# Patient Record
Sex: FEMALE | Race: WHITE | Employment: OTHER | URBAN - NONMETROPOLITAN AREA
[De-identification: names, ages, dates, MRNs, and addresses within clinical notes are randomized per-mention and may not be internally consistent; named-entity substitution may affect disease eponyms.]

---

## 2017-02-07 ENCOUNTER — OFFICE VISIT (OUTPATIENT)
Dept: FAMILY MEDICINE CLINIC | Age: 57
End: 2017-02-07

## 2017-02-07 VITALS
WEIGHT: 190.1 LBS | HEIGHT: 63 IN | DIASTOLIC BLOOD PRESSURE: 78 MMHG | SYSTOLIC BLOOD PRESSURE: 120 MMHG | OXYGEN SATURATION: 96 % | HEART RATE: 69 BPM | TEMPERATURE: 98 F | BODY MASS INDEX: 33.68 KG/M2

## 2017-02-07 DIAGNOSIS — Z13.1 SCREENING FOR DIABETES MELLITUS (DM): ICD-10-CM

## 2017-02-07 DIAGNOSIS — M54.50 CHRONIC BILATERAL LOW BACK PAIN WITHOUT SCIATICA: ICD-10-CM

## 2017-02-07 DIAGNOSIS — Z13.220 SCREENING CHOLESTEROL LEVEL: ICD-10-CM

## 2017-02-07 DIAGNOSIS — G89.29 CHRONIC BILATERAL LOW BACK PAIN WITHOUT SCIATICA: ICD-10-CM

## 2017-02-07 DIAGNOSIS — Z11.59 NEED FOR HEPATITIS C SCREENING TEST: ICD-10-CM

## 2017-02-07 DIAGNOSIS — Z11.4 SCREENING FOR HIV (HUMAN IMMUNODEFICIENCY VIRUS): ICD-10-CM

## 2017-02-07 DIAGNOSIS — F41.1 GAD (GENERALIZED ANXIETY DISORDER): Primary | ICD-10-CM

## 2017-02-07 DIAGNOSIS — K50.90 EXACERBATION OF CROHN'S DISEASE, WITHOUT COMPLICATIONS (HCC): ICD-10-CM

## 2017-02-07 DIAGNOSIS — Z12.11 SCREEN FOR COLON CANCER: ICD-10-CM

## 2017-02-07 PROCEDURE — 99203 OFFICE O/P NEW LOW 30 MIN: CPT | Performed by: FAMILY MEDICINE

## 2017-02-07 RX ORDER — MESALAMINE 800 MG/1
TABLET, DELAYED RELEASE ORAL
COMMUNITY
Start: 2016-11-10

## 2017-02-07 RX ORDER — TRAMADOL HYDROCHLORIDE 50 MG/1
50 TABLET ORAL EVERY 6 HOURS PRN
Status: CANCELLED | OUTPATIENT
Start: 2017-02-07 | End: 2017-02-17

## 2017-02-07 RX ORDER — AZELASTINE HYDROCHLORIDE, FLUTICASONE PROPIONATE 137; 50 UG/1; UG/1
SPRAY, METERED NASAL
COMMUNITY
Start: 2016-09-14 | End: 2017-09-14

## 2017-02-07 RX ORDER — ALPRAZOLAM 1 MG/1
TABLET ORAL
Qty: 90 TABLET | Refills: 0 | Status: CANCELLED
Start: 2017-02-07

## 2017-02-07 RX ORDER — CROMOLYN SODIUM 40 MG/ML
SOLUTION/ DROPS OPHTHALMIC
COMMUNITY
Start: 2015-10-14 | End: 2020-10-14 | Stop reason: ALTCHOICE

## 2017-02-07 RX ORDER — CETIRIZINE HYDROCHLORIDE 10 MG/1
TABLET ORAL
COMMUNITY
Start: 2016-10-24 | End: 2017-04-22 | Stop reason: ALTCHOICE

## 2017-02-07 RX ORDER — ALPRAZOLAM 1 MG/1
TABLET ORAL
COMMUNITY
Start: 2016-12-16 | End: 2017-02-07 | Stop reason: SDUPTHER

## 2017-02-07 ASSESSMENT — ENCOUNTER SYMPTOMS
SHORTNESS OF BREATH: 0
SINUS PRESSURE: 0
ABDOMINAL PAIN: 1
CHEST TIGHTNESS: 0
DIARRHEA: 1
COUGH: 0
CONSTIPATION: 0
BACK PAIN: 1
WHEEZING: 0

## 2017-02-07 ASSESSMENT — PATIENT HEALTH QUESTIONNAIRE - PHQ9
1. LITTLE INTEREST OR PLEASURE IN DOING THINGS: 0
2. FEELING DOWN, DEPRESSED OR HOPELESS: 0
SUM OF ALL RESPONSES TO PHQ9 QUESTIONS 1 & 2: 0
SUM OF ALL RESPONSES TO PHQ QUESTIONS 1-9: 0

## 2017-08-07 ENCOUNTER — HOSPITAL ENCOUNTER (EMERGENCY)
Age: 57
Discharge: HOME | End: 2017-08-07
Payer: MEDICAID

## 2017-08-07 VITALS
RESPIRATION RATE: 18 BRPM | TEMPERATURE: 97.4 F | OXYGEN SATURATION: 96 % | DIASTOLIC BLOOD PRESSURE: 87 MMHG | SYSTOLIC BLOOD PRESSURE: 167 MMHG | BODY MASS INDEX: 34.55 KG/M2 | HEART RATE: 76 BPM | WEIGHT: 195 LBS | HEIGHT: 63 IN

## 2017-08-07 DIAGNOSIS — G56.03 CARPAL TUNNEL SYNDROME, BILATERAL: Primary | ICD-10-CM

## 2017-08-07 PROCEDURE — 99213 OFFICE O/P EST LOW 20 MIN: CPT | Performed by: NURSE PRACTITIONER

## 2017-08-07 PROCEDURE — 99212 OFFICE O/P EST SF 10 MIN: CPT

## 2017-08-07 RX ORDER — TRAMADOL HYDROCHLORIDE 50 MG/1
50 TABLET ORAL EVERY 8 HOURS PRN
Qty: 9 TABLET | Refills: 0 | Status: SHIPPED | OUTPATIENT
Start: 2017-08-07 | End: 2017-09-07 | Stop reason: RX

## 2017-08-07 RX ORDER — PREDNISONE 20 MG/1
TABLET ORAL
Qty: 10 TABLET | Refills: 0 | Status: SHIPPED | OUTPATIENT
Start: 2017-08-07 | End: 2018-02-14 | Stop reason: ALTCHOICE

## 2017-08-07 ASSESSMENT — PAIN DESCRIPTION - LOCATION: LOCATION: HAND

## 2017-08-07 ASSESSMENT — PAIN DESCRIPTION - PROGRESSION: CLINICAL_PROGRESSION: NOT CHANGED

## 2017-08-07 ASSESSMENT — PAIN DESCRIPTION - DESCRIPTORS: DESCRIPTORS: ACHING

## 2017-08-07 ASSESSMENT — PAIN DESCRIPTION - FREQUENCY: FREQUENCY: INTERMITTENT

## 2017-08-07 ASSESSMENT — ENCOUNTER SYMPTOMS: SHORTNESS OF BREATH: 0

## 2017-08-07 ASSESSMENT — PAIN SCALES - GENERAL: PAINLEVEL_OUTOF10: 5

## 2017-08-07 ASSESSMENT — PAIN DESCRIPTION - ONSET: ONSET: ON-GOING

## 2017-08-07 ASSESSMENT — PAIN DESCRIPTION - ORIENTATION: ORIENTATION: LEFT;RIGHT

## 2017-08-07 ASSESSMENT — PAIN DESCRIPTION - PAIN TYPE: TYPE: ACUTE PAIN

## 2017-09-06 ENCOUNTER — NURSE TRIAGE (OUTPATIENT)
Dept: ADMINISTRATIVE | Age: 57
End: 2017-09-06

## 2017-09-07 ENCOUNTER — HOSPITAL ENCOUNTER (EMERGENCY)
Age: 57
Discharge: HOME | End: 2017-09-07
Payer: MEDICAID

## 2017-09-07 VITALS
SYSTOLIC BLOOD PRESSURE: 148 MMHG | DIASTOLIC BLOOD PRESSURE: 69 MMHG | TEMPERATURE: 98 F | OXYGEN SATURATION: 98 % | HEART RATE: 60 BPM | RESPIRATION RATE: 20 BRPM

## 2017-09-07 DIAGNOSIS — M79.642 BILATERAL HAND PAIN: ICD-10-CM

## 2017-09-07 DIAGNOSIS — M79.641 BILATERAL HAND PAIN: ICD-10-CM

## 2017-09-07 DIAGNOSIS — M79.89 BILATERAL HAND SWELLING: Primary | ICD-10-CM

## 2017-09-07 PROCEDURE — 99212 OFFICE O/P EST SF 10 MIN: CPT

## 2017-09-07 PROCEDURE — 99213 OFFICE O/P EST LOW 20 MIN: CPT | Performed by: NURSE PRACTITIONER

## 2017-09-07 RX ORDER — TRAMADOL HYDROCHLORIDE 50 MG/1
50 TABLET ORAL EVERY 8 HOURS PRN
Qty: 16 TABLET | Refills: 0 | Status: SHIPPED | OUTPATIENT
Start: 2017-09-07 | End: 2017-09-12

## 2017-09-07 ASSESSMENT — PAIN DESCRIPTION - ORIENTATION: ORIENTATION: RIGHT;LEFT

## 2017-09-07 ASSESSMENT — PAIN DESCRIPTION - PAIN TYPE: TYPE: ACUTE PAIN

## 2017-09-07 ASSESSMENT — PAIN DESCRIPTION - ONSET: ONSET: PROGRESSIVE

## 2017-09-07 ASSESSMENT — PAIN DESCRIPTION - PROGRESSION: CLINICAL_PROGRESSION: GRADUALLY WORSENING

## 2017-09-07 ASSESSMENT — PAIN DESCRIPTION - LOCATION: LOCATION: HAND

## 2017-09-07 ASSESSMENT — PAIN SCALES - GENERAL: PAINLEVEL_OUTOF10: 7

## 2017-09-07 ASSESSMENT — ENCOUNTER SYMPTOMS
ROS SKIN COMMENTS: ERYTHEMA
COLOR CHANGE: 1

## 2017-09-07 ASSESSMENT — PAIN DESCRIPTION - FREQUENCY: FREQUENCY: CONTINUOUS

## 2017-09-07 ASSESSMENT — PAIN DESCRIPTION - DESCRIPTORS: DESCRIPTORS: BURNING;SORE;CONSTANT

## 2017-10-05 ENCOUNTER — HOSPITAL ENCOUNTER (EMERGENCY)
Dept: GENERAL RADIOLOGY | Age: 57
Discharge: HOME | End: 2017-10-05
Payer: MEDICAID

## 2017-10-05 ENCOUNTER — HOSPITAL ENCOUNTER (EMERGENCY)
Age: 57
Discharge: HOME | End: 2017-10-05
Payer: MEDICAID

## 2017-10-05 VITALS
TEMPERATURE: 97.7 F | RESPIRATION RATE: 18 BRPM | SYSTOLIC BLOOD PRESSURE: 158 MMHG | BODY MASS INDEX: 34.55 KG/M2 | HEIGHT: 63 IN | HEART RATE: 68 BPM | OXYGEN SATURATION: 97 % | WEIGHT: 195 LBS | DIASTOLIC BLOOD PRESSURE: 89 MMHG

## 2017-10-05 DIAGNOSIS — J20.9 ACUTE BRONCHITIS, UNSPECIFIED ORGANISM: Primary | ICD-10-CM

## 2017-10-05 DIAGNOSIS — R09.81 SINUS CONGESTION: ICD-10-CM

## 2017-10-05 DIAGNOSIS — R09.82 PND (POST-NASAL DRIP): ICD-10-CM

## 2017-10-05 PROCEDURE — 71020 XR CHEST STANDARD TWO VW: CPT

## 2017-10-05 PROCEDURE — 99213 OFFICE O/P EST LOW 20 MIN: CPT

## 2017-10-05 PROCEDURE — 99213 OFFICE O/P EST LOW 20 MIN: CPT | Performed by: NURSE PRACTITIONER

## 2017-10-05 RX ORDER — PREDNISONE 20 MG/1
20 TABLET ORAL 2 TIMES DAILY
Qty: 10 TABLET | Refills: 0 | Status: SHIPPED | OUTPATIENT
Start: 2017-10-05 | End: 2017-10-10

## 2017-10-05 RX ORDER — DOXYCYCLINE HYCLATE 100 MG
100 TABLET ORAL 2 TIMES DAILY
Qty: 20 TABLET | Refills: 0 | Status: SHIPPED | OUTPATIENT
Start: 2017-10-05 | End: 2017-10-15

## 2017-10-05 ASSESSMENT — ENCOUNTER SYMPTOMS
NAUSEA: 0
VOMITING: 0
ABDOMINAL PAIN: 0
BACK PAIN: 0
SHORTNESS OF BREATH: 1
COUGH: 1
RHINORRHEA: 1
VOICE CHANGE: 0
SINUS PRESSURE: 0
CHEST TIGHTNESS: 1
DIARRHEA: 0
SORE THROAT: 0
SINUS CONGESTION: 1

## 2017-10-05 ASSESSMENT — PAIN DESCRIPTION - LOCATION: LOCATION: GENERALIZED

## 2017-10-05 ASSESSMENT — PAIN SCALES - GENERAL: PAINLEVEL_OUTOF10: 3

## 2017-10-05 ASSESSMENT — PAIN DESCRIPTION - DESCRIPTORS: DESCRIPTORS: ACHING

## 2017-10-05 NOTE — ED AVS SNAPSHOT
Commonly known as:  XANAX   Take 1 mg by mouth 3 times daily as needed for Anxiety       ASACOL  MG Tbec TBEC tablet   Generic drug:  mesalamine   TAKE TWO TABLETS BY MOUTH THREE TIMES A DAY       aspirin 81 MG tablet   Take 81 mg by mouth daily. cromolyn 4 % ophthalmic solution   Commonly known as:  OPTICROM   Place 1 drop into both eyes 4 (four) times daily. fenofibrate 160 MG tablet   Take 160 mg by mouth daily. ipratropium-albuterol 0.5-2.5 (3) MG/3ML Soln nebulizer solution   Commonly known as:  DUONEB   Take 3 mLs by nebulization every 6 hours       loratadine-pseudoephedrine 5-120 MG per extended release tablet   Commonly known as:  CLARITIN-D 12 HOUR   Take 1 tablet by mouth 2 times daily       losartan 50 MG tablet   Commonly known as:  COZAAR   Take 50 mg by mouth daily. metoprolol tartrate 25 MG tablet   Commonly known as:  LOPRESSOR   Take 25 mg by mouth 2 times daily. niacin 50 MG tablet   Take 50 mg by mouth daily (with breakfast). pantoprazole 40 MG tablet   Commonly known as:  PROTONIX   Take 40 mg by mouth daily            Where to Get Your Medications      You can get these medications from any pharmacy     Bring a paper prescription for each of these medications     doxycycline hyclate 100 MG tablet    predniSONE 20 MG tablet               Allergies as of 10/5/2017     No Known Allergies      Immunizations as of 10/5/2017     No immunizations on file. After Visit Summary    This summary was created for you. Thank you for entrusting your care to us.   The following information includes details about your hospital/visit stay along with steps you should take to help with your recovery once you leave the hospital.  In this packet, you will find information about the topics listed below:    · Instructions about your medications including a list of your home medications  · A summary of your hospital visit · Follow-up appointments once you have left the hospital  · Your care plan at home      You may receive a survey regarding the care you received during your stay. Your input is valuable to us. We encourage you to complete and return your survey in the envelope provided. We hope you will choose us in the future for your healthcare needs. Patient Information     Patient Name FRANCA Trevino 1960      Care Provided at:     Name Address Phone       [address] [phone]            Your Visit    Here you will find information about your visit, including the reason for your visit. Please take this sheet with you when you visit your doctor or other health care provider in the future. It will help determine the best possible medical care for you at that time. If you have any questions once you leave the hospital, please call the department phone number listed below. Diagnoses this visit     Your diagnoses were ACUTE BRONCHITIS, UNSPECIFIED ORGANISM, SINUS CONGESTION, and PND (POST-NASAL DRIP). Visit Information     Date of Visit Department Dept Phone    10/5/2017 [facility] [phone]      You were seen by     You were seen by Aneesh Lau NP. Follow-up Appointments    Below is a list of your follow-up and future appointments. This may not be a complete list as you may have made appointments directly with providers that we are not aware of or your providers may have made some for you. Please call your providers to confirm appointments. It is important to keep your appointments. Please bring your current insurance card, photo ID, co-pay, and all medication bottles to your appointment. If self-pay, payment is expected at the time of service. Follow-up Information     Follow up with Shakira Trejo MD In 1 week.     Specialty:  Family Medicine Infant Death Syndrome (SIDS), acute respiratory infections, inflammation of the middle ear, and severe asthma. Over a longer time, it causes heart disease and lung cancer. There is no safe level of exposure to secondhand smoke. Important information for a smoker       SMOKING: QUIT SMOKING. THIS IS THE MOST IMPORTANT ACTION YOU CAN TAKE TO IMPROVE YOUR CURRENT AND FUTURE HEALTH. Call the [address] [place] [address] at [facility] [phone])    Smoking harms nonsmokers. When nonsmokers are around people who smoke, they absorb nicotine, carbon monoxide, and other ingredients of tobacco smoke. DO NOT SMOKE AROUND CHILDREN     Children exposed to secondhand smoke are at an increased risk of:  Sudden Infant Death Syndrome (SIDS), acute respiratory infections, inflammation of the middle ear, and severe asthma. Over a longer time, it causes heart disease and lung cancer. There is no safe level of exposure to secondhand smoke. [employer] Signup     Our records indicate that you have an active [employer] account. You can view your After Visit Summary by going to [email or web address] and logging in with your [employer] username and password. If you don't have a [employer] username and password but a parent or guardian has access to your record, the parent or guardian should login with their own [employer] username and password and access your record to view the After Visit Summary. Additional Information  If you have questions, please contact the physician practice where you receive care. Remember, [employer] is NOT to be used for urgent needs. For medical emergencies, dial 911. For questions regarding your [employer] account call [phone]. If you have a clinical question, please call your doctor's office. View your information online  ? Review your current list of  medications, immunization, and allergies.

## 2017-10-05 NOTE — ED TRIAGE NOTES
Patient states she has nasal congestion and sinus pressure. Patient states she has chest congestion with thick mucus.

## 2017-10-05 NOTE — ED PROVIDER NOTES
pain, diarrhea, nausea and vomiting. Musculoskeletal: Negative for back pain, neck pain and neck stiffness. Skin: Negative for rash. Allergic/Immunologic: Negative for environmental allergies. Neurological: Negative for dizziness, light-headedness and headaches. Hematological: Negative for adenopathy. PAST MEDICAL HISTORY         Diagnosis Date    Acne     Allergic rhinitis     Angular cheilitis     Asthma     Crohn's disease ([address]     GERD (gastroesophageal reflux disease)     Hyperlipidemia     Hypertension     Insulin resistance     Pelvic pain     question polycystic ovarian syndrome    Pneumonia     Xerosis of skin     feet       SURGICAL HISTORY     Patient  has a past surgical history that includes ovarian cyst removal (approx 1998); Colonoscopy (4/17/06); Colonoscopy (9/29/03); Colonoscopy (9/98); other surgical history; Tubal ligation (Bilateral); Breast surgery; and Hysterectomy (2011). CURRENT MEDICATIONS       Previous Medications    ALBUTEROL (PROVENTIL HFA;VENTOLIN HFA) 108 (90 BASE) MCG/ACT INHALER    Inhale 2 puffs into the lungs every 6 hours as needed for Wheezing. ALPRAZOLAM (XANAX) 1 MG TABLET    Take 1 mg by mouth 3 times daily as needed for Anxiety     ASPIRIN 81 MG TABLET    Take 81 mg by mouth daily. CROMOLYN (OPTICROM) 4 % OPHTHALMIC SOLUTION    Place 1 drop into both eyes 4 (four) times daily. FENOFIBRATE 160 MG TABLET    Take 160 mg by mouth daily. FLUTICASONE-SALMETEROL (ADVAIR HFA) 115-21 MCG/ACT INHALER    Inhale into the lungs    IPRATROPIUM-ALBUTEROL (DUONEB) 0.5-2.5 (3) MG/3ML SOLN NEBULIZER SOLUTION    Take 3 mLs by nebulization every 6 hours    LORATADINE-PSEUDOEPHEDRINE (CLARITIN-D 12 HOUR) 5-120 MG PER EXTENDED RELEASE TABLET    Take 1 tablet by mouth 2 times daily    LOSARTAN (COZAAR) 50 MG TABLET    Take 50 mg by mouth daily.     MESALAMINE (ASACOL HD) 800 MG TBEC TBEC TABLET    TAKE TWO TABLETS BY MOUTH THREE TIMES A DAY    METOPROLOL Hearing, tympanic membrane, external ear and ear canal normal. No drainage, swelling or tenderness. No mastoid tenderness. Tympanic membrane is not erythematous. Nose: Mucosal edema and rhinorrhea present. Right sinus exhibits no maxillary sinus tenderness and no frontal sinus tenderness. Left sinus exhibits no maxillary sinus tenderness and no frontal sinus tenderness. Mouth/Throat: Uvula is midline, oropharynx is clear and moist and mucous membranes are normal. No oropharyngeal exudate, posterior oropharyngeal edema or posterior oropharyngeal erythema. Neck: Normal range of motion and full passive range of motion without pain. Neck supple. No spinous process tenderness and no muscular tenderness present. Cardiovascular: Normal rate, regular rhythm, S1 normal, S2 normal and normal heart sounds. Pulmonary/Chest: Effort normal. No accessory muscle usage. No respiratory distress. She has decreased breath sounds in the right middle field, the right lower field, the left middle field and the left lower field. She has no wheezes. She has no rhonchi. She has no rales. She exhibits no tenderness. Abdominal: Normal appearance. Lymphadenopathy:        Head (right side): No submental, no submandibular, no tonsillar, no preauricular, no posterior auricular and no occipital adenopathy present. Head (left side): No submental, no submandibular, no tonsillar, no preauricular, no posterior auricular and no occipital adenopathy present. She has no cervical adenopathy. Right: No supraclavicular adenopathy present. Left: No supraclavicular adenopathy present. Neurological: She is alert and oriented to person, place, and time. Skin: Skin is warm and dry. She is not diaphoretic. Nursing note and vitals reviewed. DIAGNOSTIC RESULTS   Labs:No results found for this visit on 10/05/17.     IMAGING:    XR CHEST STANDARD (2 VW)   Final Result   Normal chest            **This report has been

## 2018-02-14 ENCOUNTER — APPOINTMENT (OUTPATIENT)
Dept: GENERAL RADIOLOGY | Age: 58
End: 2018-02-14
Payer: MEDICAID

## 2018-02-14 ENCOUNTER — HOSPITAL ENCOUNTER (EMERGENCY)
Age: 58
Discharge: HOME | End: 2018-02-14
Attending: EMERGENCY MEDICINE
Payer: MEDICAID

## 2018-02-14 VITALS
BODY MASS INDEX: 33.66 KG/M2 | TEMPERATURE: 96.8 F | WEIGHT: 190 LBS | RESPIRATION RATE: 18 BRPM | HEIGHT: 63 IN | DIASTOLIC BLOOD PRESSURE: 70 MMHG | OXYGEN SATURATION: 93 % | HEART RATE: 78 BPM | SYSTOLIC BLOOD PRESSURE: 135 MMHG

## 2018-02-14 DIAGNOSIS — J44.1 COPD EXACERBATION (HCC): ICD-10-CM

## 2018-02-14 DIAGNOSIS — J40 BRONCHITIS: Primary | ICD-10-CM

## 2018-02-14 DIAGNOSIS — Z72.0 TOBACCO ABUSE: ICD-10-CM

## 2018-02-14 LAB
FLU A ANTIGEN: NEGATIVE
FLU B ANTIGEN: NEGATIVE

## 2018-02-14 PROCEDURE — 6370000000 HC RX 637 (ALT 250 FOR IP): Performed by: EMERGENCY MEDICINE

## 2018-02-14 PROCEDURE — 99285 EMERGENCY DEPT VISIT HI MDM: CPT

## 2018-02-14 PROCEDURE — 71046 X-RAY EXAM CHEST 2 VIEWS: CPT

## 2018-02-14 PROCEDURE — 87804 INFLUENZA ASSAY W/OPTIC: CPT

## 2018-02-14 RX ORDER — IPRATROPIUM BROMIDE AND ALBUTEROL SULFATE 2.5; .5 MG/3ML; MG/3ML
1 SOLUTION RESPIRATORY (INHALATION) ONCE
Status: COMPLETED | OUTPATIENT
Start: 2018-02-14 | End: 2018-02-14

## 2018-02-14 RX ORDER — METHYLPREDNISOLONE 4 MG/1
TABLET ORAL
Qty: 1 KIT | Refills: 0 | Status: SHIPPED | OUTPATIENT
Start: 2018-02-14 | End: 2018-02-20

## 2018-02-14 RX ORDER — LEVOFLOXACIN 500 MG/1
500 TABLET, FILM COATED ORAL DAILY
Qty: 10 TABLET | Refills: 0 | Status: SHIPPED | OUTPATIENT
Start: 2018-02-14 | End: 2018-02-24

## 2018-02-14 RX ORDER — BENZONATATE 100 MG/1
100 CAPSULE ORAL 3 TIMES DAILY PRN
Qty: 30 CAPSULE | Refills: 0 | Status: SHIPPED | OUTPATIENT
Start: 2018-02-14 | End: 2018-02-21

## 2018-02-14 RX ADMIN — IPRATROPIUM BROMIDE AND ALBUTEROL SULFATE 1 AMPULE: .5; 3 SOLUTION RESPIRATORY (INHALATION) at 16:55

## 2018-02-14 ASSESSMENT — ENCOUNTER SYMPTOMS
ABDOMINAL PAIN: 0
DIARRHEA: 0
EYE PAIN: 0
SORE THROAT: 0
CONSTIPATION: 0
SHORTNESS OF BREATH: 1
VOMITING: 0
WHEEZING: 1
SINUS PRESSURE: 0
EYE ITCHING: 0
NAUSEA: 0
COUGH: 1
RHINORRHEA: 0
EYE REDNESS: 0

## 2018-02-14 ASSESSMENT — PAIN DESCRIPTION - FREQUENCY: FREQUENCY: CONTINUOUS

## 2018-02-14 ASSESSMENT — PAIN DESCRIPTION - PAIN TYPE: TYPE: ACUTE PAIN

## 2018-02-14 ASSESSMENT — PAIN DESCRIPTION - LOCATION: LOCATION: BACK

## 2018-02-14 ASSESSMENT — PAIN SCALES - GENERAL: PAINLEVEL_OUTOF10: 4

## 2018-02-14 ASSESSMENT — PAIN DESCRIPTION - ORIENTATION: ORIENTATION: MID;LOWER

## 2018-02-14 ASSESSMENT — PAIN DESCRIPTION - DESCRIPTORS: DESCRIPTORS: SORE

## 2018-02-14 NOTE — ED PROVIDER NOTES
[facility] GERD (gastroesophageal reflux disease); Hyperlipidemia; Hypertension; Insulin resistance; Pelvic pain; Pneumonia; and Xerosis of skin. SURGICAL HISTORY      has a past surgical history that includes ovarian cyst removal (approx ); Colonoscopy (06); Colonoscopy (03); Colonoscopy (); other surgical history; Tubal ligation (Bilateral); Breast surgery; and Hysterectomy (). CURRENT MEDICATIONS       Previous Medications    ALBUTEROL (PROVENTIL HFA;VENTOLIN HFA) 108 (90 BASE) MCG/ACT INHALER    Inhale 2 puffs into the lungs every 6 hours as needed for Wheezing. ALPRAZOLAM (XANAX) 1 MG TABLET    Take 1 mg by mouth 3 times daily as needed for Anxiety     ASPIRIN 81 MG TABLET    Take 81 mg by mouth daily. CROMOLYN (OPTICROM) 4 % OPHTHALMIC SOLUTION    Place 1 drop into both eyes 4 (four) times daily. FENOFIBRATE 160 MG TABLET    Take 160 mg by mouth daily. FLUTICASONE-SALMETEROL (ADVAIR HFA) 115-21 MCG/ACT INHALER    Inhale into the lungs    IPRATROPIUM-ALBUTEROL (DUONEB) 0.5-2.5 (3) MG/3ML SOLN NEBULIZER SOLUTION    Take 3 mLs by nebulization every 6 hours    LORATADINE-PSEUDOEPHEDRINE (CLARITIN-D 12 HOUR) 5-120 MG PER EXTENDED RELEASE TABLET    Take 1 tablet by mouth 2 times daily    LOSARTAN (COZAAR) 50 MG TABLET    Take 50 mg by mouth daily. MESALAMINE (ASACOL HD) 800 MG TBEC TBEC TABLET    TAKE TWO TABLETS BY MOUTH THREE TIMES A DAY    METOPROLOL (LOPRESSOR) 25 MG TABLET    Take 25 mg by mouth 2 times daily. NIACIN 50 MG TABLET    Take 50 mg by mouth daily (with breakfast). PANTOPRAZOLE (PROTONIX) 40 MG TABLET    Take 40 mg by mouth daily       ALLERGIES     has No Known Allergies. FAMILY HISTORY     indicated that her mother is alive. She indicated that her father is . She indicated that her maternal grandmother is . She indicated that the status of her daughter is unknown.  She indicated that the status of her child is unknown.    family Efforts were made to edit the dictations but occasionally words are mis-transcribed. )    MD Christina Rodríguez MD  02/14/18 8164

## 2018-07-05 ENCOUNTER — HOSPITAL ENCOUNTER (EMERGENCY)
Age: 58
Discharge: HOME | End: 2018-07-05
Payer: MEDICAID

## 2018-07-05 VITALS
RESPIRATION RATE: 16 BRPM | TEMPERATURE: 98 F | HEIGHT: 63 IN | HEART RATE: 70 BPM | SYSTOLIC BLOOD PRESSURE: 106 MMHG | WEIGHT: 190 LBS | OXYGEN SATURATION: 98 % | DIASTOLIC BLOOD PRESSURE: 63 MMHG | BODY MASS INDEX: 33.66 KG/M2

## 2018-07-05 DIAGNOSIS — R10.13 DYSPEPSIA: Primary | ICD-10-CM

## 2018-07-05 LAB
ALBUMIN SERPL-MCNC: 4.2 G/DL (ref 3.5–5.1)
ALP BLD-CCNC: 74 U/L (ref 38–126)
ALT SERPL-CCNC: 30 U/L (ref 11–66)
ANION GAP SERPL CALCULATED.3IONS-SCNC: 14 MEQ/L (ref 8–16)
AST SERPL-CCNC: 20 U/L (ref 5–40)
BACTERIA: NORMAL
BASOPHILS # BLD: 0.7 %
BASOPHILS ABSOLUTE: 0.1 THOU/MM3 (ref 0–0.1)
BILIRUB SERPL-MCNC: 0.3 MG/DL (ref 0.3–1.2)
BILIRUBIN DIRECT: < 0.2 MG/DL (ref 0–0.3)
BILIRUBIN URINE: NEGATIVE
BLOOD, URINE: NEGATIVE
BUN BLDV-MCNC: 18 MG/DL (ref 7–22)
CALCIUM SERPL-MCNC: 9.8 MG/DL (ref 8.5–10.5)
CASTS: NORMAL /LPF
CASTS: NORMAL /LPF
CHARACTER, URINE: CLEAR
CHLORIDE BLD-SCNC: 102 MEQ/L (ref 98–111)
CO2: 23 MEQ/L (ref 23–33)
COLOR: YELLOW
CREAT SERPL-MCNC: 0.9 MG/DL (ref 0.4–1.2)
CRYSTALS: NORMAL
EOSINOPHIL # BLD: 2.5 %
EOSINOPHILS ABSOLUTE: 0.3 THOU/MM3 (ref 0–0.4)
EPITHELIAL CELLS, UA: NORMAL /HPF
ERYTHROCYTE [DISTWIDTH] IN BLOOD BY AUTOMATED COUNT: 13.2 % (ref 11.5–14.5)
ERYTHROCYTE [DISTWIDTH] IN BLOOD BY AUTOMATED COUNT: 43.2 FL (ref 35–45)
GFR SERPL CREATININE-BSD FRML MDRD: 64 ML/MIN/1.73M2
GLUCOSE BLD-MCNC: 143 MG/DL (ref 70–108)
GLUCOSE, URINE: NEGATIVE MG/DL
HCT VFR BLD CALC: 48.7 % (ref 37–47)
HEMOGLOBIN: 16.4 GM/DL (ref 12–16)
IMMATURE GRANS (ABS): 0.07 THOU/MM3 (ref 0–0.07)
IMMATURE GRANULOCYTES: 0.7 %
KETONES, URINE: NEGATIVE
LEUKOCYTE ESTERASE, URINE: NEGATIVE
LIPASE: 68 U/L (ref 5.6–51.3)
LYMPHOCYTES # BLD: 24.9 %
LYMPHOCYTES ABSOLUTE: 2.6 THOU/MM3 (ref 1–4.8)
MAGNESIUM: 2 MG/DL (ref 1.6–2.4)
MCH RBC QN AUTO: 30.3 PG (ref 26–33)
MCHC RBC AUTO-ENTMCNC: 33.7 GM/DL (ref 32.2–35.5)
MCV RBC AUTO: 89.9 FL (ref 81–99)
MISCELLANEOUS LAB TEST RESULT: NORMAL
MONOCYTES # BLD: 5.9 %
MONOCYTES ABSOLUTE: 0.6 THOU/MM3 (ref 0.4–1.3)
NITRITE, URINE: NEGATIVE
NUCLEATED RED BLOOD CELLS: 0 /100 WBC
OSMOLALITY CALCULATION: 281.9 MOSMOL/KG (ref 275–300)
PH UA: 5
PLATELET # BLD: 227 THOU/MM3 (ref 130–400)
PMV BLD AUTO: 10.7 FL (ref 9.4–12.4)
POTASSIUM SERPL-SCNC: 4.3 MEQ/L (ref 3.5–5.2)
PROTEIN UA: NEGATIVE MG/DL
RBC # BLD: 5.42 MILL/MM3 (ref 4.2–5.4)
RBC URINE: NORMAL /HPF
RENAL EPITHELIAL, UA: NORMAL
SEG NEUTROPHILS: 65.3 %
SEGMENTED NEUTROPHILS ABSOLUTE COUNT: 6.9 THOU/MM3 (ref 1.8–7.7)
SODIUM BLD-SCNC: 139 MEQ/L (ref 135–145)
SPECIFIC GRAVITY UA: 1.02 (ref 1–1.03)
TOTAL PROTEIN: 7.2 G/DL (ref 6.1–8)
UROBILINOGEN, URINE: 0.2 EU/DL
WBC # BLD: 10.6 THOU/MM3 (ref 4.8–10.8)
WBC UA: NORMAL /HPF
YEAST: NORMAL

## 2018-07-05 PROCEDURE — 80053 COMPREHEN METABOLIC PANEL: CPT

## 2018-07-05 PROCEDURE — 83690 ASSAY OF LIPASE: CPT

## 2018-07-05 PROCEDURE — 99284 EMERGENCY DEPT VISIT MOD MDM: CPT

## 2018-07-05 PROCEDURE — 2580000003 HC RX 258: Performed by: NURSE PRACTITIONER

## 2018-07-05 PROCEDURE — 96375 TX/PRO/DX INJ NEW DRUG ADDON: CPT

## 2018-07-05 PROCEDURE — 82248 BILIRUBIN DIRECT: CPT

## 2018-07-05 PROCEDURE — 83735 ASSAY OF MAGNESIUM: CPT

## 2018-07-05 PROCEDURE — 96374 THER/PROPH/DIAG INJ IV PUSH: CPT

## 2018-07-05 PROCEDURE — 6360000002 HC RX W HCPCS: Performed by: NURSE PRACTITIONER

## 2018-07-05 PROCEDURE — 36415 COLL VENOUS BLD VENIPUNCTURE: CPT

## 2018-07-05 PROCEDURE — 85025 COMPLETE CBC W/AUTO DIFF WBC: CPT

## 2018-07-05 PROCEDURE — 6370000000 HC RX 637 (ALT 250 FOR IP): Performed by: NURSE PRACTITIONER

## 2018-07-05 PROCEDURE — 81001 URINALYSIS AUTO W/SCOPE: CPT

## 2018-07-05 RX ORDER — MORPHINE SULFATE 2 MG/ML
2 INJECTION, SOLUTION INTRAMUSCULAR; INTRAVENOUS ONCE
Status: COMPLETED | OUTPATIENT
Start: 2018-07-05 | End: 2018-07-05

## 2018-07-05 RX ORDER — PANTOPRAZOLE SODIUM 40 MG/1
40 TABLET, DELAYED RELEASE ORAL DAILY
Qty: 30 TABLET | Refills: 0 | Status: SHIPPED | OUTPATIENT
Start: 2018-07-05

## 2018-07-05 RX ORDER — 0.9 % SODIUM CHLORIDE 0.9 %
1000 INTRAVENOUS SOLUTION INTRAVENOUS ONCE
Status: COMPLETED | OUTPATIENT
Start: 2018-07-05 | End: 2018-07-05

## 2018-07-05 RX ORDER — ONDANSETRON 2 MG/ML
4 INJECTION INTRAMUSCULAR; INTRAVENOUS ONCE
Status: COMPLETED | OUTPATIENT
Start: 2018-07-05 | End: 2018-07-05

## 2018-07-05 RX ADMIN — SODIUM CHLORIDE 1000 ML: 9 INJECTION, SOLUTION INTRAVENOUS at 10:01

## 2018-07-05 RX ADMIN — MORPHINE SULFATE 2 MG: 2 INJECTION, SOLUTION INTRAMUSCULAR; INTRAVENOUS at 10:57

## 2018-07-05 RX ADMIN — LIDOCAINE HYDROCHLORIDE: 20 SOLUTION ORAL; TOPICAL at 09:57

## 2018-07-05 RX ADMIN — ONDANSETRON 4 MG: 2 INJECTION INTRAMUSCULAR; INTRAVENOUS at 09:57

## 2018-07-05 ASSESSMENT — ENCOUNTER SYMPTOMS
ABDOMINAL PAIN: 1
ABDOMINAL DISTENTION: 0
SHORTNESS OF BREATH: 0
WHEEZING: 0
PHOTOPHOBIA: 0
NAUSEA: 1
SINUS PRESSURE: 0
BACK PAIN: 0
VOMITING: 0
COLOR CHANGE: 0
CONSTIPATION: 0
EYE REDNESS: 0
COUGH: 0
CHEST TIGHTNESS: 0
BLOOD IN STOOL: 0
SORE THROAT: 0
RHINORRHEA: 0
VOICE CHANGE: 0
DIARRHEA: 0

## 2018-07-05 ASSESSMENT — PAIN DESCRIPTION - LOCATION: LOCATION: ABDOMEN

## 2018-07-05 ASSESSMENT — PAIN DESCRIPTION - DESCRIPTORS: DESCRIPTORS: BURNING

## 2018-07-05 ASSESSMENT — PAIN SCALES - GENERAL
PAINLEVEL_OUTOF10: 8
PAINLEVEL_OUTOF10: 8

## 2018-07-05 ASSESSMENT — PAIN DESCRIPTION - ORIENTATION: ORIENTATION: UPPER

## 2018-07-05 ASSESSMENT — PAIN DESCRIPTION - FREQUENCY: FREQUENCY: CONTINUOUS

## 2018-07-05 ASSESSMENT — PAIN DESCRIPTION - PAIN TYPE: TYPE: ACUTE PAIN

## 2018-07-05 NOTE — ED PROVIDER NOTES
vomiting. Endocrine: Negative for cold intolerance, heat intolerance, polydipsia, polyphagia and polyuria. Genitourinary: Negative for difficulty urinating, dysuria, flank pain, frequency and vaginal pain. Musculoskeletal: Negative for arthralgias, back pain, gait problem, joint swelling, neck pain and neck stiffness. Skin: Negative for color change and rash. Allergic/Immunologic: Negative for immunocompromised state. Neurological: Negative for dizziness, tremors, weakness, light-headedness, numbness and headaches. Hematological: Does not bruise/bleed easily. Psychiatric/Behavioral: Negative for behavioral problems, confusion, decreased concentration, hallucinations, self-injury and suicidal ideas. The patient is not nervous/anxious. PAST MEDICAL HISTORY    has a past medical history of Acne; Allergic rhinitis; Angular cheilitis; Asthma; Cro[facility]'s disease ([facility] GERD (gastroesophageal reflux disease); Hyperlipidemia; Hypertension; Insulin resistance; Pelvic pain; Pneumonia; and Xerosis of skin. SURGICAL HISTORY      has a past surgical history that includes ovarian cyst removal (approx 1998); Colonoscopy (4/17/06); Colonoscopy (9/29/03); Colonoscopy (9/98); other surgical history; Tubal ligation (Bilateral); Breast surgery; and Hysterectomy (2011). CURRENT MEDICATIONS       Previous Medications    ALBUTEROL (PROVENTIL HFA;VENTOLIN HFA) 108 (90 BASE) MCG/ACT INHALER    Inhale 2 puffs into the lungs every 6 hours as needed for Wheezing. ALPRAZOLAM (XANAX) 1 MG TABLET    Take 1 mg by mouth 3 times daily as needed for Anxiety     ASPIRIN 81 MG TABLET    Take 81 mg by mouth daily. CROMOLYN (OPTICROM) 4 % OPHTHALMIC SOLUTION    Place 1 drop into both eyes 4 (four) times daily. FENOFIBRATE 160 MG TABLET    Take 160 mg by mouth daily.     FLUTICASONE-SALMETEROL (ADVAIR HFA) 115-21 MCG/ACT INHALER    Inhale into the lungs    IPRATROPIUM-ALBUTEROL (DUONEB) 0.5-2.5 (3) MG/3ML SOLN NEBULIZER and moist.   Eyes: Conjunctivae and EOM are normal. Pupils are equal, round, and reactive to light. Neck: Normal range of motion. Neck supple. Cardiovascular: Normal rate, regular rhythm, S1 normal, S2 normal, normal heart sounds and intact distal pulses. Pulmonary/Chest: Effort normal. No respiratory distress. She has no decreased breath sounds. She has wheezes. She has no rhonchi. She has no rales. She exhibits no tenderness. Abdominal: Soft. Normal appearance and bowel sounds are normal. She exhibits no distension. There is tenderness in the epigastric area and left upper quadrant. Musculoskeletal: Normal range of motion. Lymphadenopathy:     She has no cervical adenopathy. Neurological: She is alert and oriented to person, place, and time. Skin: Skin is warm, dry and intact. Psychiatric: She has a normal mood and affect. Her speech is normal and behavior is normal. Thought content normal.   Nursing note and vitals reviewed. DIFFERENTIAL DIAGNOSIS:   Cholecystitis, cholelithiasis, gastritis, acid reflux, chron's flare    DIAGNOSTIC RESULTS     EKG: All EKG's are interpreted by the Emergency Department Physician who either signs or Co-signs this chart in the absence of a cardiologist.    None    RADIOLOGY: non-plain film images(s) such as CT, Ultrasound and MRI are read by the radiologist.  Plain radiographic images are visualized and preliminarily interpreted by the emergency physician unless otherwise stated below.   No orders to display         LABS:   Labs Reviewed   CBC WITH AUTO DIFFERENTIAL - Abnormal; Notable for the following:        Result Value    RBC 5.42 (*)     Hemoglobin 16.4 (*)     Hematocrit 48.7 (*)     All other components within normal limits   BASIC METABOLIC PANEL - Abnormal; Notable for the following:     Glucose 143 (*)     All other components within normal limits   LIPASE - Abnormal; Notable for the following:     Lipase 68.0 (*)     All other components within normal limits   GLOMERULAR FILTRATION RATE, ESTIMATED - Abnormal; Notable for the following:     Est, Glom Filt Rate 64 (*)     All other components within normal limits   HEPATIC FUNCTION PANEL   MAGNESIUM   URINALYSIS WITH MICROSCOPIC   ANION GAP   OSMOLALITY       EMERGENCY DEPARTMENT COURSE:   Vitals:    Vitals:    07/05/18 0919 07/05/18 1025   BP: 120/87 106/63   Pulse: 68 70   Resp: 16 16   Temp: 98 °F (36.7 °C)    TempSrc: Oral    SpO2: 97% 98%   Weight: 190 lb (86.2 kg)    Height: 5' 3\" (1.6 m)      10:55 AM: Discussed results, diagnosis and plan with the patient. Questions were addressed. Disposition and follow-up were agreed upon. Specific discharge instructions explained, including reasons to return to the emergency department. MDM  The patient presents to the ED with complaints of abdominal pain. The patient was not in any acute distress. The patient's physical exam showed epigastric and LUQ tenderness. Wheezes in the lungs. Within the department, the patient was treated with morphine, Zofran, IV fluids and a gi cocktail. Patient's status improved during the duration of their stay. Labs were ordered, and reviewed with the patient. Lipase was elevated at 68.0 although this appears to be chronic. I explained my proposed course of treatment with the patient, and they were amenable to my decision. The patient will be discharged home with Protonix and miracle mouthwash, and will need to follow-up with Lio Deleon MD. The patient will need to come back to the ER if their symptoms worsen, or become more severe in nature.      Medications   0.9 % sodium chloride bolus (1,000 mLs Intravenous New Bag 7/5/18 1001)   morphine injection 2 mg (not administered)   ondansetron (ZOFRAN) injection 4 mg (4 mg Intravenous Given 7/5/18 0957)   aluminum & magnesium hydroxide-simethicone (MAALOX) 30 mL, lidocaine viscous (XYLOCAINE) 5 mL (GI COCKTAIL) ( Oral Given 7/5/18 0957)       Patient was seen independently by

## 2018-07-05 NOTE — ED NOTES
Pt upated on poc and medicated per order. Pt provided urine clean catch urine, sample was sent to lab. Pt resting comfortably with side rails up and call light in reach.       Deneen Middleton RN  07/05/18 3141

## 2018-09-19 ENCOUNTER — HOSPITAL ENCOUNTER (EMERGENCY)
Age: 58
Discharge: HOME | End: 2018-09-19
Attending: NURSE PRACTITIONER
Payer: MEDICAID

## 2018-09-19 VITALS
RESPIRATION RATE: 18 BRPM | HEART RATE: 86 BPM | SYSTOLIC BLOOD PRESSURE: 154 MMHG | DIASTOLIC BLOOD PRESSURE: 79 MMHG | OXYGEN SATURATION: 95 % | TEMPERATURE: 97.9 F

## 2018-09-19 DIAGNOSIS — N76.4 LEFT GENITAL LABIAL ABSCESS: Primary | ICD-10-CM

## 2018-09-19 PROCEDURE — 99213 OFFICE O/P EST LOW 20 MIN: CPT | Performed by: NURSE PRACTITIONER

## 2018-09-19 PROCEDURE — 99212 OFFICE O/P EST SF 10 MIN: CPT

## 2018-09-19 RX ORDER — CEPHALEXIN 500 MG/1
500 CAPSULE ORAL 4 TIMES DAILY
Qty: 40 CAPSULE | Refills: 0 | Status: SHIPPED | OUTPATIENT
Start: 2018-09-19 | End: 2019-02-28 | Stop reason: ALTCHOICE

## 2018-09-19 ASSESSMENT — PAIN DESCRIPTION - LOCATION: LOCATION: VAGINA

## 2018-09-19 ASSESSMENT — PAIN SCALES - GENERAL: PAINLEVEL_OUTOF10: 9

## 2018-09-19 ASSESSMENT — PAIN DESCRIPTION - PAIN TYPE: TYPE: ACUTE PAIN

## 2018-09-19 ASSESSMENT — PAIN DESCRIPTION - DESCRIPTORS: DESCRIPTORS: SORE

## 2018-09-19 NOTE — ED PROVIDER NOTES
[address]  Urgent Care Encounter      CHIEF COMPLAINT       Chief Complaint   Patient presents with    Abscess     two swollen lumps in vaginal area    Thrush     corners of her mouth        Nurses Notes reviewed and I agree except as noted in the HPI. HISTORY OF PRESENT ILLNESS   Marcelina Harris is a 62 y.o. female who presents With 2 painful raised lesions on the inside of her left labia. She initially noticed this approximately 10 days ago. She denies any fever or any other symptoms. She does admit to trying to drain these by squeezing them on her own. She says it's uncomfortable to sit. She states she has not had this in the past.  She also complained of the nurse that she has thrush in the corners of her mouth. This patient is very familiar to me and I am aware that this is a chronic problem that has gone on for many years. Her primary care provider is treating that with nystatin. I explained to her that we won't be addressing that tonight. REVIEW OF SYSTEMS     Review of Systems   Constitutional: Negative for appetite change, chills and fever. Skin: Positive for wound (2 painful lumps left labia). PAST MEDICAL HISTORY         Diagnosis Date    Acne     Allergic rhinitis     Angular cheilitis     Asthma     Cro[facility]'s disease ([facility]     GERD (gastroesophageal reflux disease)     Hyperlipidemia     Hypertension     Insulin resistance     Pelvic pain     question polycystic ovarian syndrome    Pneumonia     Xerosis of skin     feet       SURGICAL HISTORY     Patient  has a past surgical history that includes ovarian cyst removal (approx 1998); Colonoscopy (4/17/06); Colonoscopy (9/29/03); Colonoscopy (9/98); other surgical history; Tubal ligation (Bilateral); Breast surgery; and Hysterectomy (2011).     CURRENT MEDICATIONS       Previous Medications    ALBUTEROL (PROVENTIL HFA;VENTOLIN HFA) 108 (90 BASE) MCG/ACT INHALER    Inhale 2 puffs into the lungs every 6

## 2018-10-08 ENCOUNTER — HOSPITAL ENCOUNTER (EMERGENCY)
Age: 58
Discharge: HOME | End: 2018-10-08
Payer: MEDICAID

## 2018-10-08 VITALS
HEART RATE: 93 BPM | DIASTOLIC BLOOD PRESSURE: 77 MMHG | OXYGEN SATURATION: 95 % | SYSTOLIC BLOOD PRESSURE: 153 MMHG | TEMPERATURE: 98.4 F | RESPIRATION RATE: 20 BRPM

## 2018-10-08 DIAGNOSIS — J44.1 COPD EXACERBATION (HCC): Primary | ICD-10-CM

## 2018-10-08 PROCEDURE — 99212 OFFICE O/P EST SF 10 MIN: CPT

## 2018-10-08 PROCEDURE — 99213 OFFICE O/P EST LOW 20 MIN: CPT | Performed by: NURSE PRACTITIONER

## 2018-10-08 RX ORDER — BUDESONIDE AND FORMOTEROL FUMARATE DIHYDRATE 160; 4.5 UG/1; UG/1
2 AEROSOL RESPIRATORY (INHALATION) 2 TIMES DAILY
Qty: 1 INHALER | Refills: 0 | Status: SHIPPED | OUTPATIENT
Start: 2018-10-08 | End: 2019-02-28 | Stop reason: ALTCHOICE

## 2018-10-08 RX ORDER — IPRATROPIUM BROMIDE AND ALBUTEROL SULFATE 2.5; .5 MG/3ML; MG/3ML
1 SOLUTION RESPIRATORY (INHALATION) EVERY 6 HOURS
Qty: 30 ML | Refills: 0 | Status: SHIPPED | OUTPATIENT
Start: 2018-10-08

## 2018-10-08 RX ORDER — BUDESONIDE AND FORMOTEROL FUMARATE DIHYDRATE 160; 4.5 UG/1; UG/1
2 AEROSOL RESPIRATORY (INHALATION) 2 TIMES DAILY
COMMUNITY
End: 2018-10-08

## 2018-10-08 NOTE — ED TRIAGE NOTES
Patient walked to room 5 for shortness of breath onset 4 days ago and the past 3 days has been more anxious and feels like her breathing is more difficult. No other needs.

## 2018-10-09 ASSESSMENT — ENCOUNTER SYMPTOMS
VOMITING: 0
SINUS PRESSURE: 0
WHEEZING: 0
SHORTNESS OF BREATH: 1
SORE THROAT: 0
CHEST TIGHTNESS: 0
ABDOMINAL PAIN: 0
NAUSEA: 0
COUGH: 0

## 2018-10-09 NOTE — ED NOTES
Patient discharge instructions given to pt and pt verbalized understanding, medication discussed, no other needs at this time, and pt left in stable condition.      Hakan Ramos RN  10/08/18 2029

## 2018-10-09 NOTE — ED PROVIDER NOTES
for evaluation. She is not in any distress. Encouraged to stop smoking. Further instructions outlined above. All questions answered. Patient discharged from the  center in good condition.         PATIENT REFERRED TO:  Markham Nageotte, MD [address] / [place] [id number]      DISCHARGE MEDICATIONS:  Discharge Medication List as of 10/8/2018  8:25 PM          Discharge Medication List as of 10/8/2018  8:25 PM          Discharge Medication List as of 10/8/2018  8:25 PM      CONTINUE these medications which have CHANGED    Details   budesonide-formoterol (SYMBICORT) 160-4.5 MCG/ACT AERO Inhale 2 puffs into the lungs 2 times daily, Disp-1 Inhaler, R-0Print      ipratropium-albuterol (DUONEB) 0.5-2.5 (3) MG/3ML SOLN nebulizer solution Take 3 mLs by nebulization every 6 hours, Disp-30 mL, R-0Print             Carlos Cadet, APRN - CNP    (Please note that portions of this note were completed with a voice recognition program.  Efforts were made to edit the dictations but occasionally words are mis-transcribed.)         FREDDY Fraser - OLGA  10/09/18 0191

## 2018-11-06 ENCOUNTER — HOSPITAL ENCOUNTER (EMERGENCY)
Age: 58
Discharge: HOME | End: 2018-11-06
Payer: MEDICAID

## 2018-11-06 VITALS
SYSTOLIC BLOOD PRESSURE: 149 MMHG | WEIGHT: 190 LBS | HEIGHT: 63 IN | DIASTOLIC BLOOD PRESSURE: 72 MMHG | HEART RATE: 83 BPM | OXYGEN SATURATION: 97 % | TEMPERATURE: 97.8 F | BODY MASS INDEX: 33.66 KG/M2 | RESPIRATION RATE: 16 BRPM

## 2018-11-06 DIAGNOSIS — J20.9 COPD WITH ACUTE BRONCHITIS (HCC): Primary | ICD-10-CM

## 2018-11-06 DIAGNOSIS — F17.200 TOBACCO DEPENDENCY: ICD-10-CM

## 2018-11-06 DIAGNOSIS — J44.0 COPD WITH ACUTE BRONCHITIS (HCC): Primary | ICD-10-CM

## 2018-11-06 PROCEDURE — 99213 OFFICE O/P EST LOW 20 MIN: CPT | Performed by: NURSE PRACTITIONER

## 2018-11-06 PROCEDURE — 99212 OFFICE O/P EST SF 10 MIN: CPT

## 2018-11-06 RX ORDER — PREDNISONE 20 MG/1
20 TABLET ORAL 2 TIMES DAILY
Qty: 10 TABLET | Refills: 0 | Status: SHIPPED | OUTPATIENT
Start: 2018-11-06 | End: 2018-11-11

## 2018-11-06 RX ORDER — LEVOFLOXACIN 500 MG/1
500 TABLET, FILM COATED ORAL DAILY
Qty: 10 TABLET | Refills: 0 | Status: SHIPPED | OUTPATIENT
Start: 2018-11-06 | End: 2018-11-16

## 2018-11-06 ASSESSMENT — ENCOUNTER SYMPTOMS
COUGH: 1
NAUSEA: 0
SINUS PRESSURE: 0
VOMITING: 0
CHEST TIGHTNESS: 0
BACK PAIN: 0
DIARRHEA: 0
ABDOMINAL PAIN: 0
SORE THROAT: 0
RHINORRHEA: 0
SINUS PAIN: 0

## 2018-11-06 NOTE — ED PROVIDER NOTES
sinus tenderness and no frontal sinus tenderness. Left sinus exhibits no maxillary sinus tenderness and no frontal sinus tenderness. Mouth/Throat: Uvula is midline, oropharynx is clear and moist and mucous membranes are normal. No oropharyngeal exudate, posterior oropharyngeal edema or posterior oropharyngeal erythema. Neck: Normal range of motion and full passive range of motion without pain. Neck supple. No spinous process tenderness present. Normal range of motion present. Cardiovascular: Normal rate, regular rhythm, S1 normal, S2 normal and normal heart sounds. Pulmonary/Chest: Effort normal. No accessory muscle usage. No respiratory distress. She has decreased breath sounds in the right lower field and the left lower field. She has no wheezes. She has no rhonchi. She has no rales. She exhibits no tenderness. Abdominal: Normal appearance. Lymphadenopathy:        Head (right side): No submental, no submandibular, no tonsillar, no preauricular, no posterior auricular and no occipital adenopathy present. Head (left side): No submental, no submandibular, no tonsillar, no preauricular, no posterior auricular and no occipital adenopathy present. She has no cervical adenopathy. Right: No supraclavicular adenopathy present. Left: No supraclavicular adenopathy present. Neurological: She is alert and oriented to person, place, and time. Skin: Skin is warm and dry. She is not diaphoretic. Nursing note and vitals reviewed. DIAGNOSTIC RESULTS     Labs:No results found for this visit on 11/06/18. IMAGING:    No orders to display         EKG:      URGENT CARE COURSE:     Vitals:    11/06/18 1721   BP: (!) 149/72   Pulse: 83   Resp: 16   Temp: 97.8 °F (36.6 °C)   TempSrc: Tympanic   SpO2: 97%   Weight: 190 lb (86.2 kg)   Height: 5' 3\" (1.6 m)       Medications - No data to display         PROCEDURES:  None    FINAL IMPRESSION      1. COPD with acute bronchitis (HCC)    2. Tobacco dependency          DISPOSITION/PLAN      I did discuss clinical findings with the patient as well as vital signs in assessment findings. He was advised that the Patient has signs and symptoms of upper respiratory infection or bronchitis. Patient is afebrile and stable. Patient can use Tylenol and/or OTC cough syrup. Avoid tobacco use/exposure,Take medication as directed,Drink Lots of fluids and Use Inhalers as directed if prescribed. Advised to follow up with family doctor in the next 2-3 days for reevaluation. The patient may return to urgent care if does not get better or symptoms worsen. However the patient is advised to go to ER immediately if present symptoms worsen, high fever >102 , vomiting, breathing difficulty, chest pain, lethargy or new symptoms develop. Patient/ parents understands this approach of home management and agrees to the treatment plan.       PATIENTREFERRED TO:  Fina Cantu MD  [place] [id number]      DISCHARGEMEDICATIONS:  New Prescriptions    LEVOFLOXACIN (LEVAQUIN) 500 MG TABLET    Take 1 tablet by mouth daily for 10 days 1 po q day x 10 days    PREDNISONE (DELTASONE) 20 MG TABLET    Take 1 tablet by mouth 2 times daily for 5 days       Discontinued Medications    No medications on file       Current Discharge Medication List          FREDDY Ruiz CNP    (Please note that portions of this note were completed with a voice recognition program. Efforts were made to edit the dictations but occasionally words are mis-transcribed.)           FREDDY Ruiz CNP  11/06/18 4238

## 2019-01-28 ENCOUNTER — HOSPITAL ENCOUNTER (EMERGENCY)
Age: 59
Discharge: LEFT BEFORE DISCHARGE | End: 2019-01-28
Attending: EMERGENCY MEDICINE
Payer: MEDICAID

## 2019-01-28 VITALS
WEIGHT: 180 LBS | TEMPERATURE: 98.2 F | HEIGHT: 63 IN | BODY MASS INDEX: 31.89 KG/M2 | HEART RATE: 94 BPM | OXYGEN SATURATION: 97 % | RESPIRATION RATE: 18 BRPM | SYSTOLIC BLOOD PRESSURE: 145 MMHG | DIASTOLIC BLOOD PRESSURE: 89 MMHG

## 2019-01-28 DIAGNOSIS — K64.4 HEMORRHOIDS, EXTERNAL: Primary | ICD-10-CM

## 2019-01-28 DIAGNOSIS — K62.89 RECTAL PAIN: ICD-10-CM

## 2019-01-28 LAB — HEMOCCULT STL QL: NEGATIVE

## 2019-01-28 PROCEDURE — 99282 EMERGENCY DEPT VISIT SF MDM: CPT

## 2019-01-28 PROCEDURE — 82272 OCCULT BLD FECES 1-3 TESTS: CPT

## 2019-01-28 RX ORDER — HYDROCORTISONE ACETATE 25 MG/1
25 SUPPOSITORY RECTAL 2 TIMES DAILY
Qty: 15 SUPPOSITORY | Refills: 0 | Status: SHIPPED | OUTPATIENT
Start: 2019-01-28 | End: 2020-03-31

## 2019-01-28 ASSESSMENT — ENCOUNTER SYMPTOMS
COUGH: 0
ABDOMINAL PAIN: 0
VOMITING: 0
NAUSEA: 0
SHORTNESS OF BREATH: 0
BACK PAIN: 0
RHINORRHEA: 0
EYE DISCHARGE: 0
DIARRHEA: 1
CONSTIPATION: 1
EYE PAIN: 0
WHEEZING: 0
SORE THROAT: 0

## 2019-01-28 ASSESSMENT — PAIN DESCRIPTION - PAIN TYPE: TYPE: ACUTE PAIN

## 2019-01-28 ASSESSMENT — PAIN SCALES - GENERAL: PAINLEVEL_OUTOF10: 6

## 2019-01-28 ASSESSMENT — PAIN SCALES - WONG BAKER: WONGBAKER_NUMERICALRESPONSE: 6

## 2019-01-28 ASSESSMENT — PAIN DESCRIPTION - LOCATION: LOCATION: RECTUM

## 2019-01-30 ENCOUNTER — NURSE TRIAGE (OUTPATIENT)
Dept: ADMINISTRATIVE | Age: 59
End: 2019-01-30

## 2019-02-28 ENCOUNTER — HOSPITAL ENCOUNTER (EMERGENCY)
Age: 59
Discharge: HOME | End: 2019-02-28
Payer: MEDICAID

## 2019-02-28 VITALS
TEMPERATURE: 98.1 F | DIASTOLIC BLOOD PRESSURE: 86 MMHG | HEART RATE: 81 BPM | SYSTOLIC BLOOD PRESSURE: 180 MMHG | BODY MASS INDEX: 31.89 KG/M2 | WEIGHT: 180 LBS | OXYGEN SATURATION: 93 % | RESPIRATION RATE: 18 BRPM | HEIGHT: 63 IN

## 2019-02-28 DIAGNOSIS — J20.9 COPD (CHRONIC OBSTRUCTIVE PULMONARY DISEASE) WITH ACUTE BRONCHITIS (HCC): Primary | ICD-10-CM

## 2019-02-28 DIAGNOSIS — J44.0 COPD (CHRONIC OBSTRUCTIVE PULMONARY DISEASE) WITH ACUTE BRONCHITIS (HCC): Primary | ICD-10-CM

## 2019-02-28 PROCEDURE — 99212 OFFICE O/P EST SF 10 MIN: CPT

## 2019-02-28 PROCEDURE — 99213 OFFICE O/P EST LOW 20 MIN: CPT | Performed by: NURSE PRACTITIONER

## 2019-02-28 RX ORDER — FLUTICASONE FUROATE AND VILANTEROL 100; 25 UG/1; UG/1
1 POWDER RESPIRATORY (INHALATION) DAILY
Qty: 60 EACH | Refills: 0 | Status: SHIPPED | OUTPATIENT
Start: 2019-02-28

## 2019-02-28 RX ORDER — PREDNISONE 20 MG/1
20 TABLET ORAL 2 TIMES DAILY
Qty: 10 TABLET | Refills: 0 | Status: SHIPPED | OUTPATIENT
Start: 2019-02-28 | End: 2019-03-05

## 2019-02-28 RX ORDER — DOXYCYCLINE HYCLATE 100 MG
100 TABLET ORAL 2 TIMES DAILY
Qty: 20 TABLET | Refills: 0 | Status: SHIPPED | OUTPATIENT
Start: 2019-02-28 | End: 2019-03-10

## 2019-02-28 ASSESSMENT — ENCOUNTER SYMPTOMS
NAUSEA: 0
SHORTNESS OF BREATH: 1
SINUS PAIN: 0
SINUS CONGESTION: 0
DIARRHEA: 0
SINUS PRESSURE: 0
COUGH: 1
RHINORRHEA: 0
CHEST TIGHTNESS: 0
SORE THROAT: 0
ABDOMINAL PAIN: 0
VOMITING: 0
WHEEZING: 1
BACK PAIN: 0

## 2019-02-28 ASSESSMENT — PAIN DESCRIPTION - PAIN TYPE: TYPE: ACUTE PAIN

## 2019-02-28 ASSESSMENT — PAIN - FUNCTIONAL ASSESSMENT: PAIN_FUNCTIONAL_ASSESSMENT: ACTIVITIES ARE NOT PREVENTED

## 2019-02-28 ASSESSMENT — PAIN SCALES - GENERAL: PAINLEVEL_OUTOF10: 2

## 2019-02-28 ASSESSMENT — PAIN DESCRIPTION - LOCATION: LOCATION: CHEST

## 2019-02-28 ASSESSMENT — PAIN DESCRIPTION - PROGRESSION: CLINICAL_PROGRESSION: NOT CHANGED

## 2019-02-28 ASSESSMENT — PAIN DESCRIPTION - DESCRIPTORS: DESCRIPTORS: ACHING

## 2019-10-29 ENCOUNTER — NURSE ONLY (OUTPATIENT)
Dept: LAB | Age: 59
End: 2019-10-29

## 2019-10-29 LAB
ALBUMIN SERPL-MCNC: 4.3 G/DL (ref 3.5–5.1)
ALP BLD-CCNC: 96 U/L (ref 38–126)
ALT SERPL-CCNC: 31 U/L (ref 11–66)
ANION GAP SERPL CALCULATED.3IONS-SCNC: 15 MEQ/L (ref 8–16)
AST SERPL-CCNC: 26 U/L (ref 5–40)
AVERAGE GLUCOSE: 123 MG/DL (ref 70–126)
BASOPHILS # BLD: 0.7 %
BASOPHILS ABSOLUTE: 0.1 THOU/MM3 (ref 0–0.1)
BILIRUB SERPL-MCNC: 0.4 MG/DL (ref 0.3–1.2)
BUN BLDV-MCNC: 13 MG/DL (ref 7–22)
CALCIUM SERPL-MCNC: 9.9 MG/DL (ref 8.5–10.5)
CHLORIDE BLD-SCNC: 103 MEQ/L (ref 98–111)
CHOLESTEROL, TOTAL: 210 MG/DL (ref 100–199)
CO2: 25 MEQ/L (ref 23–33)
CREAT SERPL-MCNC: 0.8 MG/DL (ref 0.4–1.2)
EOSINOPHIL # BLD: 2.2 %
EOSINOPHILS ABSOLUTE: 0.2 THOU/MM3 (ref 0–0.4)
ERYTHROCYTE [DISTWIDTH] IN BLOOD BY AUTOMATED COUNT: 13.5 % (ref 11.5–14.5)
ERYTHROCYTE [DISTWIDTH] IN BLOOD BY AUTOMATED COUNT: 45.4 FL (ref 35–45)
FOLATE: > 20 NG/ML (ref 4.8–24.2)
GFR SERPL CREATININE-BSD FRML MDRD: 73 ML/MIN/1.73M2
GLUCOSE BLD-MCNC: 97 MG/DL (ref 70–108)
HBA1C MFR BLD: 6.1 % (ref 4.4–6.4)
HCT VFR BLD CALC: 49.5 % (ref 37–47)
HDLC SERPL-MCNC: 35 MG/DL
HEMOGLOBIN: 16.3 GM/DL (ref 12–16)
IMMATURE GRANS (ABS): 0.04 THOU/MM3 (ref 0–0.07)
IMMATURE GRANULOCYTES: 0.4 %
LDL CHOLESTEROL CALCULATED: ABNORMAL MG/DL
LYMPHOCYTES # BLD: 32.4 %
LYMPHOCYTES ABSOLUTE: 3.1 THOU/MM3 (ref 1–4.8)
MCH RBC QN AUTO: 30.2 PG (ref 26–33)
MCHC RBC AUTO-ENTMCNC: 32.9 GM/DL (ref 32.2–35.5)
MCV RBC AUTO: 91.7 FL (ref 81–99)
MONOCYTES # BLD: 6.6 %
MONOCYTES ABSOLUTE: 0.6 THOU/MM3 (ref 0.4–1.3)
NUCLEATED RED BLOOD CELLS: 0 /100 WBC
PLATELET # BLD: 228 THOU/MM3 (ref 130–400)
PMV BLD AUTO: 10.3 FL (ref 9.4–12.4)
POTASSIUM SERPL-SCNC: 4.3 MEQ/L (ref 3.5–5.2)
RBC # BLD: 5.4 MILL/MM3 (ref 4.2–5.4)
RHEUMATOID FACTOR: < 10 IU/ML (ref 0–13)
SEG NEUTROPHILS: 57.7 %
SEGMENTED NEUTROPHILS ABSOLUTE COUNT: 5.5 THOU/MM3 (ref 1.8–7.7)
SODIUM BLD-SCNC: 143 MEQ/L (ref 135–145)
TOTAL PROTEIN: 7.5 G/DL (ref 6.1–8)
TRIGL SERPL-MCNC: 484 MG/DL (ref 0–199)
TSH SERPL DL<=0.05 MIU/L-ACNC: 2.16 UIU/ML (ref 0.4–4.2)
VITAMIN B-12: 980 PG/ML (ref 211–911)
WBC # BLD: 9.6 THOU/MM3 (ref 4.8–10.8)

## 2019-11-01 LAB — CYCLIC CITRULLIN PEPTIDE AB: 31 UNITS (ref 0–19)

## 2019-11-02 LAB
URINE DRUG PROFILE: NORMAL
VITAMIN B6: 16.1 NMOL/L (ref 20–125)

## 2020-03-31 ENCOUNTER — HOSPITAL ENCOUNTER (EMERGENCY)
Age: 60
Discharge: HOME | End: 2020-03-31
Payer: MEDICARE

## 2020-03-31 VITALS
TEMPERATURE: 97.4 F | HEART RATE: 69 BPM | OXYGEN SATURATION: 97 % | HEIGHT: 63 IN | DIASTOLIC BLOOD PRESSURE: 70 MMHG | WEIGHT: 185 LBS | BODY MASS INDEX: 32.78 KG/M2 | RESPIRATION RATE: 18 BRPM | SYSTOLIC BLOOD PRESSURE: 149 MMHG

## 2020-03-31 PROCEDURE — 6370000000 HC RX 637 (ALT 250 FOR IP): Performed by: NURSE PRACTITIONER

## 2020-03-31 PROCEDURE — 99213 OFFICE O/P EST LOW 20 MIN: CPT

## 2020-03-31 PROCEDURE — 99213 OFFICE O/P EST LOW 20 MIN: CPT | Performed by: NURSE PRACTITIONER

## 2020-03-31 RX ORDER — SOFT LENS RINSE,STORE SOLUTION
1 AEROSOL, SPRAY (ML) MISCELLANEOUS ONCE
Status: COMPLETED | OUTPATIENT
Start: 2020-03-31 | End: 2020-03-31

## 2020-03-31 RX ORDER — MOXIFLOXACIN 5 MG/ML
1 SOLUTION/ DROPS OPHTHALMIC 3 TIMES DAILY
Qty: 1 BOTTLE | Refills: 0 | Status: SHIPPED | OUTPATIENT
Start: 2020-03-31 | End: 2020-04-07

## 2020-03-31 RX ADMIN — Medication 1 BOTTLE: at 19:46

## 2020-03-31 ASSESSMENT — VISUAL ACUITY
OD: 20/50
OS: 20/30
OU: 20/25

## 2020-03-31 ASSESSMENT — ENCOUNTER SYMPTOMS
COLOR CHANGE: 0
EYE DISCHARGE: 0
CRUSTING: 0
SHORTNESS OF BREATH: 0
EYE INFLAMMATION: 1
DOUBLE VISION: 0
EYE ITCHING: 0
EYE PAIN: 1
EYE REDNESS: 1
PHOTOPHOBIA: 0
EYE WATERING: 0
TROUBLE SWALLOWING: 0
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
## 2020-04-01 ASSESSMENT — VISUAL ACUITY: OU: 1

## 2020-08-04 ENCOUNTER — HOSPITAL ENCOUNTER (EMERGENCY)
Age: 60
Discharge: HOME | End: 2020-08-04
Payer: MEDICARE

## 2020-08-04 VITALS
HEIGHT: 63 IN | OXYGEN SATURATION: 96 % | BODY MASS INDEX: 32.78 KG/M2 | SYSTOLIC BLOOD PRESSURE: 136 MMHG | WEIGHT: 185 LBS | RESPIRATION RATE: 16 BRPM | TEMPERATURE: 97.2 F | HEART RATE: 71 BPM | DIASTOLIC BLOOD PRESSURE: 65 MMHG

## 2020-08-04 PROCEDURE — 99213 OFFICE O/P EST LOW 20 MIN: CPT | Performed by: NURSE PRACTITIONER

## 2020-08-04 PROCEDURE — 99212 OFFICE O/P EST SF 10 MIN: CPT

## 2020-08-04 RX ORDER — CLINDAMYCIN HYDROCHLORIDE 300 MG/1
300 CAPSULE ORAL 3 TIMES DAILY
Qty: 30 CAPSULE | Refills: 0 | Status: SHIPPED | OUTPATIENT
Start: 2020-08-04 | End: 2020-08-14

## 2020-08-04 RX ORDER — AMPICILLIN 500 MG/1
500 CAPSULE ORAL 4 TIMES DAILY
COMMUNITY
End: 2020-10-14 | Stop reason: ALTCHOICE

## 2020-08-04 RX ORDER — IBUPROFEN 800 MG/1
800 TABLET ORAL EVERY 8 HOURS PRN
Qty: 42 TABLET | Refills: 0 | Status: SHIPPED | OUTPATIENT
Start: 2020-08-04 | End: 2020-08-18

## 2020-08-04 ASSESSMENT — ENCOUNTER SYMPTOMS
SHORTNESS OF BREATH: 0
COUGH: 0
NAUSEA: 0
SORE THROAT: 0
CHEST TIGHTNESS: 0
DIARRHEA: 0
STRIDOR: 0
VOMITING: 0
WHEEZING: 0

## 2020-08-04 ASSESSMENT — PAIN DESCRIPTION - FREQUENCY: FREQUENCY: CONTINUOUS

## 2020-08-04 ASSESSMENT — PAIN DESCRIPTION - PROGRESSION: CLINICAL_PROGRESSION: GRADUALLY WORSENING

## 2020-08-04 ASSESSMENT — PAIN SCALES - GENERAL: PAINLEVEL_OUTOF10: 4

## 2020-08-04 ASSESSMENT — PAIN DESCRIPTION - ONSET: ONSET: GRADUAL

## 2020-08-04 ASSESSMENT — PAIN DESCRIPTION - PAIN TYPE: TYPE: ACUTE PAIN

## 2020-08-04 ASSESSMENT — PAIN DESCRIPTION - DESCRIPTORS: DESCRIPTORS: ACHING;THROBBING

## 2020-08-04 ASSESSMENT — PAIN - FUNCTIONAL ASSESSMENT: PAIN_FUNCTIONAL_ASSESSMENT: ACTIVITIES ARE NOT PREVENTED

## 2020-08-04 NOTE — ED PROVIDER NOTES
[address]  Urgent Care Encounter       CHIEF COMPLAINT       Chief Complaint   Patient presents with    Dental Pain     had teeth pulled 5 days ago and having increased pain       Nurses Notes reviewed and I agree except as noted in the HPI. HISTORY OF PRESENT ILLNESS   Joyce Faulkner is a 61 y.o. female who presents     Patient is present in the urgent care for dental pain to right side lower and upper gums. She states that about 5 days ago she had several teeth pulled, and states that there is a piece of tooth still in right lower gumline that she will have to have removed at another date. She denies any fevers. She states that she is a current day smoker. She does not believe that the amoxicillin she was prescribed is helping, or the ibuprofen she was given. REVIEW OF SYSTEMS     Review of Systems   Constitutional: Negative for chills, fatigue and fever. HENT: Positive for dental problem. Negative for sore throat. Respiratory: Negative for cough, chest tightness, shortness of breath, wheezing and stridor. Cardiovascular: Negative for chest pain. Gastrointestinal: Negative for diarrhea, nausea and vomiting. Skin: Negative for rash. PAST MEDICAL HISTORY         Diagnosis Date    Acne     Allergic rhinitis     Angular cheilitis     Asthma     [facility]'s disease ([facility] [address]     GERD (gastroesophageal reflux disease)     Hyperlipidemia     Hypertension     Insulin resistance     Pelvic pain     question polycystic ovarian syndrome    Pneumonia     Xerosis of skin     feet       SURGICALHISTORY     Patient  has a past surgical history that includes ovarian cyst removal (approx 1998); Colonoscopy (4/17/06); Colonoscopy (9/29/03); Colonoscopy (9/98); other surgical history; Tubal ligation (Bilateral); Breast surgery; and Hysterectomy (2011).     CURRENT MEDICATIONS       Discharge Medication List as of 8/4/2020  7:40 PM      CONTINUE these medications which have daughter. SOCIAL HISTORY     Patient  reports that she has been smoking cigarettes. She has a 10.00 pack-year smoking history. She has never used smokeless tobacco. She reports that she does not drink alcohol or use drugs. PHYSICAL EXAM     ED TRIAGE VITALS  BP: 136/65, Temp: 97.2 °F (36.2 °C), Pulse: 71, Resp: 16, SpO2: 96 %,Estimated body mass index is 32.77 kg/m² as calculated from the following:    Height as of this encounter: 5' 3\" (1.6 m). Weight as of this encounter: 185 lb (83.9 kg). ,No LMP recorded. Patient has had a hysterectomy. Physical Exam  Constitutional:       General: She is not in acute distress. Appearance: Normal appearance. She is not ill-appearing, toxic-appearing or diaphoretic. HENT:      Mouth/Throat:      Lips: Pink. Mouth: Mucous membranes are moist.      Dentition: Does not have dentures. Dental tenderness present. No gingival swelling, dental caries or dental abscesses. Pharynx: Oropharynx is clear. Comments: Multiple teeth pulled  Musculoskeletal: Normal range of motion. Skin:     General: Skin is warm. Neurological:      General: No focal deficit present. Mental Status: She is alert and oriented to person, place, and time. Sensory: No sensory deficit. Psychiatric:         Mood and Affect: Mood normal.         Behavior: Behavior normal.         Thought Content: Thought content normal.         Judgment: Judgment normal.         DIAGNOSTIC RESULTS     Labs:No results found for this visit on 08/04/20. IMAGING:    No orders to display     URGENT CARE COURSE:     Vitals:    08/04/20 1916   BP: 136/65   Pulse: 71   Resp: 16   Temp: 97.2 °F (36.2 °C)   TempSrc: Temporal   SpO2: 96%   Weight: 185 lb (83.9 kg)   Height: 5' 3\" (1.6 m)       Medications - No data to display         PROCEDURES:  None    FINAL IMPRESSION      1. Dental infection    2.  Pain, dental          DISPOSITION/ PLAN   Patient is discharged home with prescription for clindamycin as well as Magic mouthwash for treatment of suspected dental infection after dental procedure. Discussed with patient that she will need to continue to follow-up with dentist, if there is any further dental work done. 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 If there is no improvement within a week do recommend follow-up with primary care provider for further evaluation.         PATIENT REFERRED TO:  MD Angela Hummel [facility] [address] [address]      DISCHARGE MEDICATIONS:  Discharge Medication List as of 8/4/2020  7:40 PM      START taking these medications    Details   clindamycin (CLEOCIN) 300 MG capsule Take 1 capsule by mouth 3 times daily for 10 days, Disp-30 capsule,R-0Print      Magic Mouthwash (MIRACLE MOUTHWASH) Swish and spit 10 mLs 4 times daily as needed for Irritation 1:1:1 Benadryl, maalox, lidocaine, Disp-480 mL,R-0Print             Discharge Medication List as of 8/4/2020  7:40 PM          Discharge Medication List as of 8/4/2020  7:40 PM          Claudene Lease, APRN - NP    (Please note that portions of this note were completed with a voice recognition program. Efforts were made to edit the dictations but occasionally words are mis-transcribed.)          FREDDY Azevedo NP  08/04/20 1950

## 2020-10-14 ENCOUNTER — HOSPITAL ENCOUNTER (EMERGENCY)
Age: 60
Discharge: HOME | End: 2020-10-14
Payer: MEDICARE

## 2020-10-14 VITALS
TEMPERATURE: 97.8 F | WEIGHT: 180 LBS | DIASTOLIC BLOOD PRESSURE: 84 MMHG | RESPIRATION RATE: 16 BRPM | BODY MASS INDEX: 31.89 KG/M2 | OXYGEN SATURATION: 94 % | SYSTOLIC BLOOD PRESSURE: 142 MMHG | HEART RATE: 87 BPM | HEIGHT: 63 IN

## 2020-10-14 PROCEDURE — 99212 OFFICE O/P EST SF 10 MIN: CPT

## 2020-10-14 PROCEDURE — 99213 OFFICE O/P EST LOW 20 MIN: CPT | Performed by: NURSE PRACTITIONER

## 2020-10-14 RX ORDER — PRAZOSIN HYDROCHLORIDE 5 MG/1
5 CAPSULE ORAL NIGHTLY
COMMUNITY

## 2020-10-14 RX ORDER — OXCARBAZEPINE 300 MG/1
300 TABLET, FILM COATED ORAL 2 TIMES DAILY
COMMUNITY

## 2020-10-14 RX ORDER — LORATADINE AND PSEUDOEPHEDRINE SULFATE 5; 120 MG/1; MG/1
1 TABLET, EXTENDED RELEASE ORAL 2 TIMES DAILY
Qty: 60 TABLET | Refills: 1 | Status: SHIPPED | OUTPATIENT
Start: 2020-10-14

## 2020-10-14 ASSESSMENT — ENCOUNTER SYMPTOMS
PHOTOPHOBIA: 0
EYE ITCHING: 0
SINUS PRESSURE: 1
EYE PAIN: 0
DIARRHEA: 0
VOMITING: 0
NAUSEA: 0
EYE REDNESS: 0
SORE THROAT: 0
EYE DISCHARGE: 1

## 2020-10-14 ASSESSMENT — VISUAL ACUITY: OU: 1

## 2020-10-14 NOTE — ED TRIAGE NOTES
To room with c/o bilateral eye drainage for 6 months. Symptoms have worsened over the past week.  She states she had Clorox bathroom  in eye 6 months ago and these symptoms started initally then but \"not that bad\"
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
## 2021-03-01 ENCOUNTER — HOSPITAL ENCOUNTER (OUTPATIENT)
Age: 61
Discharge: HOME | End: 2021-03-01
Payer: MEDICARE

## 2021-03-01 PROCEDURE — 80307 DRUG TEST PRSMV CHEM ANLYZR: CPT

## 2021-03-01 PROCEDURE — G0480 DRUG TEST DEF 1-7 CLASSES: HCPCS

## 2021-03-05 ENCOUNTER — HOSPITAL ENCOUNTER (EMERGENCY)
Age: 61
Discharge: HOME | End: 2021-03-05
Payer: MEDICARE

## 2021-03-05 VITALS
BODY MASS INDEX: 31.89 KG/M2 | HEART RATE: 86 BPM | HEIGHT: 63 IN | TEMPERATURE: 96.8 F | OXYGEN SATURATION: 93 % | WEIGHT: 180 LBS | SYSTOLIC BLOOD PRESSURE: 169 MMHG | RESPIRATION RATE: 16 BRPM | DIASTOLIC BLOOD PRESSURE: 85 MMHG

## 2021-03-05 DIAGNOSIS — N76.2 CELLULITIS OF LABIA MAJORA: Primary | ICD-10-CM

## 2021-03-05 LAB — URINE DRUG PROFILE: NORMAL

## 2021-03-05 PROCEDURE — 99214 OFFICE O/P EST MOD 30 MIN: CPT | Performed by: NURSE PRACTITIONER

## 2021-03-05 PROCEDURE — 99213 OFFICE O/P EST LOW 20 MIN: CPT

## 2021-03-05 RX ORDER — SULFAMETHOXAZOLE AND TRIMETHOPRIM 800; 160 MG/1; MG/1
1 TABLET ORAL 2 TIMES DAILY
Qty: 20 TABLET | Refills: 0 | Status: SHIPPED | OUTPATIENT
Start: 2021-03-05 | End: 2021-03-15

## 2021-03-05 ASSESSMENT — ENCOUNTER SYMPTOMS
ABDOMINAL PAIN: 0
RHINORRHEA: 0
COUGH: 0
VOMITING: 0
EYE REDNESS: 0
NAUSEA: 0
WHEEZING: 0
BACK PAIN: 0
ALLERGIC/IMMUNOLOGIC NEGATIVE: 1
DIARRHEA: 0
CONSTIPATION: 0
EYE DISCHARGE: 0
TROUBLE SWALLOWING: 0
SORE THROAT: 0
SHORTNESS OF BREATH: 0
EYE PAIN: 0
ROS SKIN COMMENTS: GROIN ABSCESS

## 2021-03-05 ASSESSMENT — PAIN DESCRIPTION - PAIN TYPE: TYPE: ACUTE PAIN

## 2021-03-05 ASSESSMENT — PAIN DESCRIPTION - LOCATION: LOCATION: GROIN

## 2021-03-05 ASSESSMENT — PAIN DESCRIPTION - FREQUENCY: FREQUENCY: CONTINUOUS

## 2021-03-05 NOTE — ED PROVIDER NOTES
[address]  Urgent Care Encounter      CHIEF COMPLAINT       Chief Complaint   Patient presents with    Abscess     left groin onset x 1 week increasingly worse        Nurses Notes reviewed and I agree except as noted in the HPI. HISTORY OF PRESENT ILLNESS   Radha Dangelo is a 61 y.o. female who presents with left labial pain and swelling and concern for infection. Patient had an abscess in this region a couple of years ago that required excision and she is afraid the same thing is developing now. Denies fever or other constitutional symptoms of infection. REVIEW OF SYSTEMS     Review of Systems   Constitutional: Negative for activity change, fatigue and fever. HENT: Negative for congestion, ear pain, rhinorrhea, sore throat and trouble swallowing. Eyes: Negative for pain, discharge and redness. Respiratory: Negative for cough, shortness of breath and wheezing. Cardiovascular: Negative. Gastrointestinal: Negative for abdominal pain, constipation, diarrhea, nausea and vomiting. Endocrine: Negative. Genitourinary: Negative for dysuria, frequency and urgency. Musculoskeletal: Negative for arthralgias, back pain and myalgias. Skin: Negative for rash. Groin abscess   Allergic/Immunologic: Negative. Neurological: Negative for dizziness, tremors, weakness and headaches. Hematological: Negative. Psychiatric/Behavioral: Negative for dysphoric mood and sleep disturbance. The patient is not nervous/anxious.         PAST MEDICAL HISTORY         Diagnosis Date    Acne     Allergic rhinitis     Angular cheilitis     Asthma     [facility]) [facility]     Crohn's disease ([facility] [address]     Depression     GERD (gastroesophageal reflux disease)     Hyperlipidemia     Hypertension     Insulin resistance     Pelvic pain     question polycystic ovarian syndrome    Pneumonia     Xerosis of skin     feet       SURGICAL HISTORY     Patient has a past surgical history that includes ovarian cyst removal (approx 1998); Colonoscopy (4/17/06); Colonoscopy (9/29/03); Colonoscopy (9/98); other surgical history; Tubal ligation (Bilateral); Breast surgery; and Hysterectomy (2011). CURRENT MEDICATIONS       Previous Medications    ALBUTEROL (PROVENTIL HFA;VENTOLIN HFA) 108 (90 BASE) MCG/ACT INHALER    Inhale 2 puffs into the lungs every 6 hours as needed for Wheezing. ALPRAZOLAM (XANAX) 1 MG TABLET    Take 0.5 mg by mouth 3 times daily as needed for Anxiety. ASPIRIN 81 MG TABLET    Take 81 mg by mouth daily. FENOFIBRATE 160 MG TABLET    Take 160 mg by mouth daily. FLUTICASONE-VILANTEROL (BREO ELLIPTA) 100-25 MCG/INH AEPB INHALER    Inhale 1 puff into the lungs daily    IBUPROFEN (ADVIL;MOTRIN) 800 MG TABLET    Take 1 tablet by mouth every 8 hours as needed for Pain    IPRATROPIUM-ALBUTEROL (DUONEB) 0.5-2.5 (3) MG/3ML SOLN NEBULIZER SOLUTION    Take 3 mLs by nebulization every 6 hours    LORATADINE-PSEUDOEPHEDRINE (CLARITIN-D 12 HOUR) 5-120 MG PER EXTENDED RELEASE TABLET    Take 1 tablet by mouth 2 times daily    LOSARTAN (COZAAR) 50 MG TABLET    Take 50 mg by mouth daily. MESALAMINE (ASACOL HD) 800 MG TBEC TBEC TABLET    TAKE TWO TABLETS BY MOUTH THREE TIMES A DAY    METOPROLOL (LOPRESSOR) 25 MG TABLET    Take 25 mg by mouth 2 times daily. NIACIN 50 MG TABLET    Take 50 mg by mouth daily (with breakfast). OXCARBAZEPINE (TRILEPTAL) 300 MG TABLET    Take 300 mg by mouth 2 times daily    PANTOPRAZOLE (PROTONIX) 40 MG TABLET    Take 1 tablet by mouth daily    PRAZOSIN (MINIPRESS) 5 MG CAPSULE    Take 5 mg by mouth nightly       ALLERGIES     Patient is has No Known Allergies. FAMILY HISTORY     Patient'sfamily history includes Asthma in her child and mother; Breast Cancer in her mother; Cancer in her maternal grandmother; Heart Attack in her father; Heart Disease in her father and other family members;  High Blood Pressure in her father; Vitals:    03/05/21 1903   BP: (!) 169/85   Pulse: 86   Resp: 16   Temp: 96.8 °F (36 °C)   TempSrc: Temporal   SpO2: 93%   Weight: 180 lb (81.6 kg)   Height: 5' 3\" (1.6 m)     Chaperoned exam was conducted with Sayra Ortiz RN in attendance. Patient has left labial erythema that is mild with swelling and tenderness, but I did not appreciate an abscess. Mild left inguinal adenopathy with none on the right. Patient is afebrile. No vaginal discharge is noted. Medications - No data to display  PROCEDURES:  None  FINAL IMPRESSION      1.  Cellulitis of labia majora        DISPOSITION/PLAN   DISPOSITION Decision To Discharge 03/05/2021 07:14:08 PM    PATIENT REFERRED TO:  MD Angela Crawford [phone]    In 3 days  If symptoms worsen    DISCHARGE MEDICATIONS:  New Prescriptions    SULFAMETHOXAZOLE-TRIMETHOPRIM (BACTRIM DS;SEPTRA DS) 800-160 MG PER TABLET    Take 1 tablet by mouth 2 times daily for 10 days     Current Discharge Medication List          FREDDY Chan - FREDDY Martines CNP  03/05/21 1918

## 2021-03-06 NOTE — ED NOTES
Assisted TAMI Castellanos CNP with abscess assessment. Faint redness noted left labia and small amount swelling noted. No open wound or drainage noted.       Greg Razo RN  03/05/21 4124

## 2021-03-06 NOTE — ED TRIAGE NOTES
Pt states she has a possible \"abscess\" left groin onset x 1 week. Pt states it is painful and swollen but denies drainage. Pt states she has had an abscess in groin area in past and has had drained and placed on antibiotics.

## 2021-03-06 NOTE — ED NOTES
Reviewed discharge instructions and prescription with patient. Voiced understanding. To lobby via ambulation. Gait steady.      Heather Browne RN  03/05/21 Jimena Graham

## 2021-04-03 ENCOUNTER — HOSPITAL ENCOUNTER (OUTPATIENT)
Age: 61
Discharge: HOME | End: 2021-04-03
Payer: MEDICARE

## 2021-04-03 LAB
AMPHETAMINE+METHAMPHETAMINE URINE SCREEN: NEGATIVE
BACTERIA: ABNORMAL /HPF
BARBITURATE QUANTITATIVE URINE: NEGATIVE
BENZODIAZEPINE QUANTITATIVE URINE: POSITIVE
BILIRUBIN URINE: NEGATIVE
BLOOD, URINE: ABNORMAL
CANNABINOID QUANTITATIVE URINE: NEGATIVE
CASTS 2: ABNORMAL /LPF
CASTS UA: ABNORMAL /LPF
CHARACTER, URINE: ABNORMAL
COCAINE METABOLITE QUANTITATIVE URINE: NEGATIVE
COLOR: ABNORMAL
CRYSTALS, UA: ABNORMAL
EPITHELIAL CELLS, UA: ABNORMAL /HPF
GLUCOSE URINE: NEGATIVE MG/DL
KETONES, URINE: ABNORMAL
LEUKOCYTE ESTERASE, URINE: NEGATIVE
MISCELLANEOUS 2: ABNORMAL
NITRITE, URINE: NEGATIVE
OPIATES, URINE: NEGATIVE
OXYCODONE: NEGATIVE
PH UA: 5.5 (ref 5–9)
PHENCYCLIDINE QUANTITATIVE URINE: NEGATIVE
PROTEIN UA: NEGATIVE
RBC URINE: ABNORMAL /HPF
RENAL EPITHELIAL, UA: ABNORMAL
SPECIFIC GRAVITY, URINE: 1.02 (ref 1–1.03)
UROBILINOGEN, URINE: 0.2 EU/DL (ref 0–1)
WBC UA: ABNORMAL /HPF
YEAST: ABNORMAL

## 2021-04-03 PROCEDURE — 80307 DRUG TEST PRSMV CHEM ANLYZR: CPT

## 2021-04-03 PROCEDURE — 81003 URINALYSIS AUTO W/O SCOPE: CPT

## 2021-04-03 PROCEDURE — 87086 URINE CULTURE/COLONY COUNT: CPT

## 2021-04-03 PROCEDURE — 81001 URINALYSIS AUTO W/SCOPE: CPT

## 2021-04-05 LAB
ORGANISM: ABNORMAL
URINE CULTURE REFLEX: ABNORMAL

## 2021-06-14 ENCOUNTER — HOSPITAL ENCOUNTER (EMERGENCY)
Age: 61
Discharge: HOME | End: 2021-06-14
Payer: MEDICARE

## 2021-06-14 VITALS
SYSTOLIC BLOOD PRESSURE: 146 MMHG | WEIGHT: 182.6 LBS | BODY MASS INDEX: 32.36 KG/M2 | DIASTOLIC BLOOD PRESSURE: 72 MMHG | TEMPERATURE: 97.1 F | OXYGEN SATURATION: 94 % | RESPIRATION RATE: 18 BRPM | HEART RATE: 79 BPM | HEIGHT: 63 IN

## 2021-06-14 DIAGNOSIS — J44.1 COPD WITH ACUTE EXACERBATION (HCC): Primary | ICD-10-CM

## 2021-06-14 PROCEDURE — 6370000000 HC RX 637 (ALT 250 FOR IP): Performed by: NURSE PRACTITIONER

## 2021-06-14 PROCEDURE — 99213 OFFICE O/P EST LOW 20 MIN: CPT

## 2021-06-14 PROCEDURE — 94640 AIRWAY INHALATION TREATMENT: CPT

## 2021-06-14 PROCEDURE — 99214 OFFICE O/P EST MOD 30 MIN: CPT | Performed by: NURSE PRACTITIONER

## 2021-06-14 RX ORDER — ALBUTEROL SULFATE 90 UG/1
2 AEROSOL, METERED RESPIRATORY (INHALATION) EVERY 6 HOURS PRN
Qty: 1 INHALER | Refills: 0 | Status: SHIPPED | OUTPATIENT
Start: 2021-06-14

## 2021-06-14 RX ORDER — BENZONATATE 200 MG/1
200 CAPSULE ORAL 3 TIMES DAILY PRN
Qty: 21 CAPSULE | Refills: 0 | Status: SHIPPED | OUTPATIENT
Start: 2021-06-14 | End: 2021-06-21

## 2021-06-14 RX ORDER — AMOXICILLIN AND CLAVULANATE POTASSIUM 875; 125 MG/1; MG/1
1 TABLET, FILM COATED ORAL 2 TIMES DAILY
Qty: 20 TABLET | Refills: 0 | Status: SHIPPED | OUTPATIENT
Start: 2021-06-14 | End: 2021-06-24

## 2021-06-14 RX ORDER — PREDNISONE 20 MG/1
40 TABLET ORAL DAILY
Qty: 10 TABLET | Refills: 0 | Status: SHIPPED | OUTPATIENT
Start: 2021-06-14 | End: 2021-06-19

## 2021-06-14 RX ORDER — AMLODIPINE BESYLATE 10 MG/1
20 TABLET ORAL DAILY
COMMUNITY
Start: 2020-06-20

## 2021-06-14 RX ORDER — IPRATROPIUM BROMIDE AND ALBUTEROL SULFATE 2.5; .5 MG/3ML; MG/3ML
1 SOLUTION RESPIRATORY (INHALATION)
Status: COMPLETED | OUTPATIENT
Start: 2021-06-14 | End: 2021-06-14

## 2021-06-14 RX ADMIN — IPRATROPIUM BROMIDE AND ALBUTEROL SULFATE 1 AMPULE: .5; 3 SOLUTION RESPIRATORY (INHALATION) at 15:54

## 2021-06-14 ASSESSMENT — ENCOUNTER SYMPTOMS
DIARRHEA: 0
VOMITING: 0
COUGH: 1
WHEEZING: 1
SINUS PRESSURE: 0
SHORTNESS OF BREATH: 1
SORE THROAT: 1
NAUSEA: 1

## 2021-06-14 NOTE — ED NOTES
Reviewed discharge instructions and prescriptions. Voiced understanding. To lobby with family via ambulation.      Domenic Lorenz RN  06/14/21 2747

## 2021-06-14 NOTE — ED PROVIDER NOTES
[address]  Urgent Care Encounter       CHIEF COMPLAINT       Chief Complaint   Patient presents with    Cough     x 4 days       Nurses Notes reviewed and I agree except as noted in the HPI. HISTORY OF PRESENT ILLNESS   Nehemiah Cronin is a 61 y.o. female who presents with complaints of cough and increasing shortness of breath over the last 4 days. Patient has also been wheezing. She does report a history of COPD and asthma. She reports a subjective fever with feeling warm as well as on and off chills. She does report coughing up green sputum. She has occasional mild nausea but no vomiting or diarrhea. She also reports a mild sore throat and intermittent headaches. She does smoke 1/2 pack of cigarettes per day. She is fully vaccinated against COVID-19. The history is provided by the patient. REVIEW OF SYSTEMS     Review of Systems   Constitutional: Positive for appetite change, chills, fatigue and fever (subjective). HENT: Positive for congestion, ear pain (mild) and sore throat (mild). Negative for sinus pressure. Respiratory: Positive for cough, shortness of breath and wheezing. Cardiovascular: Negative for chest pain. Gastrointestinal: Positive for nausea (occasional). Negative for diarrhea and vomiting. Musculoskeletal: Negative for myalgias. Skin: Negative for rash. Neurological: Positive for headaches. PAST MEDICAL HISTORY         Diagnosis Date    Acne     Allergic rhinitis     Angular cheilitis     Asthma     COPD (chronic obstructive pulmonary disease) ([address]     Crohn's disease ([address]     Depression     GERD (gastroesophageal reflux disease)     Hyperlipidemia     Hypertension     Insulin resistance     Pelvic pain     question polycystic ovarian syndrome    Pneumonia     Xerosis of skin     feet       SURGICALHISTORY     Patient  has a past surgical history that includes ovarian cyst removal (approx 1998);  Colonoscopy (4/17/06); Colonoscopy (9/29/03); Colonoscopy (9/98); other surgical history; Tubal ligation (Bilateral); Breast surgery; and Hysterectomy (2011). CURRENT MEDICATIONS       Previous Medications    ALBUTEROL (PROVENTIL HFA;VENTOLIN HFA) 108 (90 BASE) MCG/ACT INHALER    Inhale 2 puffs into the lungs every 6 hours as needed for Wheezing. ALPRAZOLAM (XANAX) 1 MG TABLET    Take 0.5 mg by mouth 3 times daily as needed for Anxiety. AMLODIPINE (NORVASC) 10 MG TABLET    Take 20 mg by mouth daily    ASPIRIN 81 MG TABLET    Take 81 mg by mouth daily. FENOFIBRATE 160 MG TABLET    Take 160 mg by mouth daily. FLUTICASONE-VILANTEROL (BREO ELLIPTA) 100-25 MCG/INH AEPB INHALER    Inhale 1 puff into the lungs daily    IBUPROFEN (ADVIL;MOTRIN) 800 MG TABLET    Take 1 tablet by mouth every 8 hours as needed for Pain    IPRATROPIUM-ALBUTEROL (DUONEB) 0.5-2.5 (3) MG/3ML SOLN NEBULIZER SOLUTION    Take 3 mLs by nebulization every 6 hours    LORATADINE-PSEUDOEPHEDRINE (CLARITIN-D 12 HOUR) 5-120 MG PER EXTENDED RELEASE TABLET    Take 1 tablet by mouth 2 times daily    LOSARTAN (COZAAR) 50 MG TABLET    Take 50 mg by mouth daily. MESALAMINE (ASACOL HD) 800 MG TBEC TBEC TABLET    TAKE TWO TABLETS BY MOUTH THREE TIMES A DAY    METOPROLOL (LOPRESSOR) 25 MG TABLET    Take 25 mg by mouth 2 times daily. NIACIN 50 MG TABLET    Take 50 mg by mouth daily (with breakfast). OXCARBAZEPINE (TRILEPTAL) 300 MG TABLET    Take 300 mg by mouth 2 times daily    PANTOPRAZOLE (PROTONIX) 40 MG TABLET    Take 1 tablet by mouth daily    PRAZOSIN (MINIPRESS) 5 MG CAPSULE    Take 5 mg by mouth nightly       ALLERGIES     Patient is has No Known Allergies.     Patients   Immunization History   Administered Date(s) Administered    COVID-19, Moderna, PF, 100mcg/0.5mL 03/11/2021, 04/08/2021       FAMILY HISTORY     Patient's family history includes Asthma in her child and mother; Breast Cancer in her mother; Cancer in her maternal grandmother; Heart Attack in her father; Heart Disease in her father and other family members; High Blood Pressure in her father; High Cholesterol in her father; Hypertension in an other family member; Other in her father; Stroke in her father; Ulcerative Colitis in her daughter. SOCIAL HISTORY     Patient  reports that she has been smoking cigarettes. She has a 10.00 pack-year smoking history. She has never used smokeless tobacco. She reports that she does not drink alcohol and does not use drugs. PHYSICAL EXAM     ED TRIAGE VITALS  BP: (!) 146/72, Temp: 97.1 °F (36.2 °C), Pulse: 79, Resp: 18, SpO2: 94 %,Estimated body mass index is 32.35 kg/m² as calculated from the following:    Height as of this encounter: 5' 3\" (1.6 m). Weight as of this encounter: 182 lb 9.6 oz (82.8 kg). ,No LMP recorded. Patient has had a hysterectomy. Physical Exam  Vitals and nursing note reviewed. Constitutional:       General: She is not in acute distress. Appearance: She is well-developed. She is not ill-appearing. HENT:      Head: Normocephalic and atraumatic. Right Ear: Tympanic membrane and ear canal normal.      Left Ear: Tympanic membrane and ear canal normal.      Nose: Congestion present. Mouth/Throat:      Lips: Pink. Mouth: Mucous membranes are moist.      Pharynx: Oropharynx is clear. No oropharyngeal exudate or posterior oropharyngeal erythema. Eyes:      General: Lids are normal. No scleral icterus. Conjunctiva/sclera:      Right eye: Right conjunctiva is not injected. Left eye: Left conjunctiva is not injected. Pupils: Pupils are equal.   Cardiovascular:      Rate and Rhythm: Normal rate and regular rhythm. Heart sounds: Normal heart sounds, S1 normal and S2 normal.   Pulmonary:      Effort: Pulmonary effort is normal. No respiratory distress. Breath sounds: Wheezing (throughout) present.    Musculoskeletal:      Comments: Normal active ROM x 4 extremities  Gait steady   Lymphadenopathy: Comments: No head or neck adenopathy   Skin:     General: Skin is warm and dry. Findings: No rash (to exposed areas of skin). Nails: There is no clubbing. Neurological:      General: No focal deficit present. Mental Status: She is alert and oriented to person, place, and time. Sensory: No sensory deficit. Comments: Answers questions readily and appropriately   Psychiatric:         Mood and Affect: Mood normal.         Speech: Speech normal.         Behavior: Behavior normal. Behavior is cooperative. DIAGNOSTIC RESULTS     Labs:No results found for this visit on 06/14/21. IMAGING:    No orders to display         EKG:      URGENT CARE COURSE:     Vitals:    06/14/21 1533   BP: (!) 146/72   Pulse: 79   Resp: 18   Temp: 97.1 °F (36.2 °C)   TempSrc: Temporal   SpO2: 94%   Weight: 182 lb 9.6 oz (82.8 kg)   Height: 5' 3\" (1.6 m)       Medications   ipratropium-albuterol (DUONEB) nebulizer solution 1 ampule (1 ampule Inhalation Given 6/14/21 1554)            PROCEDURES:  None    FINAL IMPRESSION      1. COPD with acute exacerbation [facility])          DISPOSITION/ PLAN     Patient presents with an acute COPD exacerbation. She was treated with a DuoNeb breathing treatment while in the urgent care center with improvement in her respiratory status and wheezing. She will be treated with Augmentin and prednisone. Benzonatate for cough and and albuterol MDI as needed for wheezing and shortness of breath. She is to follow-up with her family doctor in the next 2 to 3 days symptoms do not improve. ER for worsening condition as discussed. Further instructions were outlined verbally and in the patient's discharge instructions. All the patient's questions were answered. The patient/parent agreed with the plan and was discharged from the [facility] in good condition.       PATIENT [facility]FERR[facility]:  [facility] [address] [address]      DISCHARGE MEDICATIONS:  New Prescriptions

## 2021-06-14 NOTE — ED NOTES
Treatment completed and discontinued. SPO2 94% on room air. R 16 HR 77.  Pt has inspiratory and expiratory wheezes noted on left posterior side and expiratory wheeze right side with some improvement. Pt states she feels \"better\". Natanael Cadet CNP updated and ok to discharge home.        Gaston David RN  06/14/21 5572

## 2021-12-14 ENCOUNTER — HOSPITAL ENCOUNTER (EMERGENCY)
Age: 61
Discharge: HOME | End: 2021-12-14
Payer: MEDICARE

## 2021-12-14 ENCOUNTER — APPOINTMENT (OUTPATIENT)
Dept: CT IMAGING | Age: 61
End: 2021-12-14
Payer: MEDICARE

## 2021-12-14 ENCOUNTER — APPOINTMENT (OUTPATIENT)
Dept: GENERAL RADIOLOGY | Age: 61
End: 2021-12-14
Payer: MEDICARE

## 2021-12-14 VITALS
SYSTOLIC BLOOD PRESSURE: 151 MMHG | OXYGEN SATURATION: 98 % | TEMPERATURE: 98.8 F | HEART RATE: 78 BPM | RESPIRATION RATE: 18 BRPM | DIASTOLIC BLOOD PRESSURE: 63 MMHG

## 2021-12-14 DIAGNOSIS — F41.1 GAD (GENERALIZED ANXIETY DISORDER): ICD-10-CM

## 2021-12-14 DIAGNOSIS — K52.9 GASTROENTERITIS: Primary | ICD-10-CM

## 2021-12-14 DIAGNOSIS — R55 VASOVAGAL NEAR-SYNCOPE: ICD-10-CM

## 2021-12-14 DIAGNOSIS — Z87.19 HISTORY OF CROHN'S DISEASE: ICD-10-CM

## 2021-12-14 LAB
ALBUMIN SERPL-MCNC: 4.8 G/DL (ref 3.5–5.1)
ALP BLD-CCNC: 84 U/L (ref 38–126)
ALT SERPL-CCNC: 16 U/L (ref 11–66)
ANION GAP SERPL CALCULATED.3IONS-SCNC: 15 MEQ/L (ref 8–16)
AST SERPL-CCNC: 17 U/L (ref 5–40)
BASOPHILS # BLD: 0.4 %
BASOPHILS ABSOLUTE: 0.1 THOU/MM3 (ref 0–0.1)
BILIRUB SERPL-MCNC: 0.5 MG/DL (ref 0.3–1.2)
BUN BLDV-MCNC: 27 MG/DL (ref 7–22)
CALCIUM SERPL-MCNC: 9.9 MG/DL (ref 8.5–10.5)
CHLORIDE BLD-SCNC: 102 MEQ/L (ref 98–111)
CO2: 25 MEQ/L (ref 23–33)
CREAT SERPL-MCNC: 0.9 MG/DL (ref 0.4–1.2)
EKG ATRIAL RATE: 80 BPM
EKG P AXIS: 14 DEGREES
EKG P-R INTERVAL: 106 MS
EKG Q-T INTERVAL: 390 MS
EKG QRS DURATION: 120 MS
EKG QTC CALCULATION (BAZETT): 449 MS
EKG R AXIS: 50 DEGREES
EKG T AXIS: 13 DEGREES
EKG VENTRICULAR RATE: 80 BPM
EOSINOPHIL # BLD: 0.4 %
EOSINOPHILS ABSOLUTE: 0.1 THOU/MM3 (ref 0–0.4)
ERYTHROCYTE [DISTWIDTH] IN BLOOD BY AUTOMATED COUNT: 13.7 % (ref 11.5–14.5)
ERYTHROCYTE [DISTWIDTH] IN BLOOD BY AUTOMATED COUNT: 45.8 FL (ref 35–45)
FLU A ANTIGEN: NEGATIVE
FLU B ANTIGEN: NEGATIVE
GFR SERPL CREATININE-BSD FRML MDRD: 64 ML/MIN/1.73M2
GLUCOSE BLD-MCNC: 128 MG/DL (ref 70–108)
HCT VFR BLD CALC: 51.8 % (ref 37–47)
HEMOGLOBIN: 17.2 GM/DL (ref 12–16)
IMMATURE GRANS (ABS): 0.09 THOU/MM3 (ref 0–0.07)
IMMATURE GRANULOCYTES: 0.6 %
LIPASE: 71.9 U/L (ref 5.6–51.3)
LYMPHOCYTES # BLD: 4.4 %
LYMPHOCYTES ABSOLUTE: 0.7 THOU/MM3 (ref 1–4.8)
MCH RBC QN AUTO: 30.6 PG (ref 26–33)
MCHC RBC AUTO-ENTMCNC: 33.2 GM/DL (ref 32.2–35.5)
MCV RBC AUTO: 92 FL (ref 81–99)
MONOCYTES # BLD: 6.4 %
MONOCYTES ABSOLUTE: 1 THOU/MM3 (ref 0.4–1.3)
NUCLEATED RED BLOOD CELLS: 0 /100 WBC
OSMOLALITY CALCULATION: 289.9 MOSMOL/KG (ref 275–300)
PLATELET # BLD: 260 THOU/MM3 (ref 130–400)
PMV BLD AUTO: 10.4 FL (ref 9.4–12.4)
POTASSIUM SERPL-SCNC: 3.9 MEQ/L (ref 3.5–5.2)
RBC # BLD: 5.63 MILL/MM3 (ref 4.2–5.4)
SARS-COV-2, NAAT: NOT  DETECTED
SEG NEUTROPHILS: 87.8 %
SEGMENTED NEUTROPHILS ABSOLUTE COUNT: 13.7 THOU/MM3 (ref 1.8–7.7)
SODIUM BLD-SCNC: 142 MEQ/L (ref 135–145)
TOTAL PROTEIN: 7.2 G/DL (ref 6.1–8)
TROPONIN T: < 0.01 NG/ML
WBC # BLD: 15.6 THOU/MM3 (ref 4.8–10.8)

## 2021-12-14 PROCEDURE — 96374 THER/PROPH/DIAG INJ IV PUSH: CPT

## 2021-12-14 PROCEDURE — 6370000000 HC RX 637 (ALT 250 FOR IP): Performed by: PHYSICIAN ASSISTANT

## 2021-12-14 PROCEDURE — 84484 ASSAY OF TROPONIN QUANT: CPT

## 2021-12-14 PROCEDURE — 2580000003 HC RX 258: Performed by: PHYSICIAN ASSISTANT

## 2021-12-14 PROCEDURE — 2500000003 HC RX 250 WO HCPCS: Performed by: PHYSICIAN ASSISTANT

## 2021-12-14 PROCEDURE — 6360000002 HC RX W HCPCS: Performed by: PHYSICIAN ASSISTANT

## 2021-12-14 PROCEDURE — 80053 COMPREHEN METABOLIC PANEL: CPT

## 2021-12-14 PROCEDURE — 93010 ELECTROCARDIOGRAM REPORT: CPT | Performed by: INTERNAL MEDICINE

## 2021-12-14 PROCEDURE — 85025 COMPLETE CBC W/AUTO DIFF WBC: CPT

## 2021-12-14 PROCEDURE — 6360000004 HC RX CONTRAST MEDICATION: Performed by: PHYSICIAN ASSISTANT

## 2021-12-14 PROCEDURE — 71046 X-RAY EXAM CHEST 2 VIEWS: CPT

## 2021-12-14 PROCEDURE — 74177 CT ABD & PELVIS W/CONTRAST: CPT

## 2021-12-14 PROCEDURE — 87635 SARS-COV-2 COVID-19 AMP PRB: CPT

## 2021-12-14 PROCEDURE — 96375 TX/PRO/DX INJ NEW DRUG ADDON: CPT

## 2021-12-14 PROCEDURE — 36415 COLL VENOUS BLD VENIPUNCTURE: CPT

## 2021-12-14 PROCEDURE — 99282 EMERGENCY DEPT VISIT SF MDM: CPT

## 2021-12-14 PROCEDURE — 87804 INFLUENZA ASSAY W/OPTIC: CPT

## 2021-12-14 PROCEDURE — 93005 ELECTROCARDIOGRAM TRACING: CPT | Performed by: PHYSICIAN ASSISTANT

## 2021-12-14 PROCEDURE — 83690 ASSAY OF LIPASE: CPT

## 2021-12-14 RX ORDER — ALPRAZOLAM 0.5 MG/1
0.5 TABLET ORAL NIGHTLY PRN
Qty: 5 TABLET | Refills: 0 | Status: SHIPPED | OUTPATIENT
Start: 2021-12-14 | End: 2021-12-14

## 2021-12-14 RX ORDER — ONDANSETRON 2 MG/ML
4 INJECTION INTRAMUSCULAR; INTRAVENOUS ONCE
Status: COMPLETED | OUTPATIENT
Start: 2021-12-14 | End: 2021-12-14

## 2021-12-14 RX ORDER — 0.9 % SODIUM CHLORIDE 0.9 %
1000 INTRAVENOUS SOLUTION INTRAVENOUS ONCE
Status: COMPLETED | OUTPATIENT
Start: 2021-12-14 | End: 2021-12-14

## 2021-12-14 RX ORDER — ALPRAZOLAM 0.5 MG/1
0.5 TABLET ORAL ONCE
Status: COMPLETED | OUTPATIENT
Start: 2021-12-14 | End: 2021-12-14

## 2021-12-14 RX ORDER — ONDANSETRON 4 MG/1
4 TABLET, ORALLY DISINTEGRATING ORAL EVERY 8 HOURS PRN
Qty: 20 TABLET | Refills: 0 | Status: SHIPPED | OUTPATIENT
Start: 2021-12-14

## 2021-12-14 RX ORDER — METOCLOPRAMIDE HYDROCHLORIDE 5 MG/ML
10 INJECTION INTRAMUSCULAR; INTRAVENOUS ONCE
Status: COMPLETED | OUTPATIENT
Start: 2021-12-14 | End: 2021-12-14

## 2021-12-14 RX ADMIN — ALPRAZOLAM 0.5 MG: 0.5 TABLET ORAL at 18:18

## 2021-12-14 RX ADMIN — ONDANSETRON 4 MG: 2 INJECTION INTRAMUSCULAR; INTRAVENOUS at 15:18

## 2021-12-14 RX ADMIN — IOPAMIDOL 80 ML: 755 INJECTION, SOLUTION INTRAVENOUS at 16:05

## 2021-12-14 RX ADMIN — SODIUM CHLORIDE 1000 ML: 9 INJECTION, SOLUTION INTRAVENOUS at 15:05

## 2021-12-14 RX ADMIN — FAMOTIDINE 20 MG: 10 INJECTION INTRAVENOUS at 15:17

## 2021-12-14 RX ADMIN — METOCLOPRAMIDE HYDROCHLORIDE 10 MG: 5 INJECTION INTRAMUSCULAR; INTRAVENOUS at 18:18

## 2021-12-14 NOTE — ED NOTES
Pt to the ED via EMS. Patient presents with complaints of Crohns Disease. Patient states that she just wants to leave but the Crohns keeps coming back. Patient is alert and oriented x 4. Respirations are regular and unlabored. Patient provided blanket.      Malcolm Aviles RN  12/14/21 9799

## 2021-12-14 NOTE — Clinical Note
Gavin Martin was seen and treated in our emergency department on 12/14/2021. She may return to work on 12/16/2021. If you have any questions or concerns, please don't hesitate to call.       Kathlean Landau, PA-C

## 2021-12-14 NOTE — LETTER
[address] [facility] [address] [address] EMERGENCY DEPT   JARON Talbert  [facility] [address]  Phone: [phone]             December 14, 2021                    To Whom It May Concern:    Darius Burns was with family in our emergency department on 12/14/2021.        Sincerely,             Signature:__________________________________

## 2021-12-14 NOTE — ED NOTES
Bed: [facility]  Expected date: 12/14/21  Expected time:   Means of arrival:   Comments:      Meeta Pagan RN  12/14/21 8537

## 2021-12-14 NOTE — ED PROVIDER NOTES
[facility] EMERGENCY DEPT      CHIEF COMPLAINT       Chief Complaint   Patient presents with    Generalized Body Aches       Nurses Notes reviewed and I agree except as noted in the HPI. HISTORY OF PRESENT ILLNESS    Miriam Tidwell is a 64 y.o. female who presents for illness. This morning patient woke up with nausea, vomiting, and epigastric discomfort. She later went to [facility] and became dyspneic, lightheaded, and had a near syncopal episode. Her son took her to her PCPs office however Ms. Elysa Koyanagi was so weak she could not get out of the car so was brought to the ER. The patient endorses feeling cold and having diarrhea. There has been watery eyes, rhinorrhea and nasal congestion for the past 2 weeks consistent with her history of allergic rhinitis. Patient reports possible bad food exposure as she has been eating fast food lately. Patient has a history of Crohn's for which she has recently been taking steroids due to a mild flare. The steroids have been helping. The patient has a history of anxiety for which she takes Xanax and has run out of the medication. The patient denies cough, chest pain, fever, cough, headache, vision changes, or other complaints. Patient has received her Covid vaccine and booster but not her influenza vaccine. REVIEW OF SYSTEMS     Review of Systems   Constitutional: Positive for appetite change and chills. Negative for fever. HENT: Positive for congestion and rhinorrhea. Negative for ear pain and sore throat. Eyes: Positive for discharge. Negative for photophobia. Respiratory: Positive for shortness of breath. Negative for cough. Cardiovascular: Negative for chest pain. Gastrointestinal: Positive for abdominal pain, blood in stool, diarrhea, nausea and vomiting. Genitourinary: Negative for dysuria, flank pain and frequency. Musculoskeletal: Negative for back pain and gait problem. Skin: Negative for rash.    Neurological: Positive for weakness and light-headedness. Negative for dizziness, syncope, numbness and headaches. Psychiatric/Behavioral: Negative for confusion and sleep disturbance. PAST MEDICAL HISTORY    has a past medical history of Acne, Allergic rhinitis, Angular cheilitis, Asthma, COPD (chronic obstructive pulmonary disease) ([facility] [address] Crohn's disease ([facility] [address] Depression, GERD (gastroesophageal reflux disease), Hyperlipidemia, Hypertension, Insulin resistance, Pelvic pain, Pneumonia, and Xerosis of skin. SURGICAL HISTORY      has a past surgical history that includes ovarian cyst removal (approx 1998); Colonoscopy (4/17/06); Colonoscopy (9/29/03); Colonoscopy (9/98); other surgical history; Tubal ligation (Bilateral); Breast surgery; and Hysterectomy (2011).     CURRENT MEDICATIONS       Discharge Medication List as of 12/14/2021  5:33 PM      CONTINUE these medications which have NOT CHANGED    Details   amLODIPine (NORVASC) 10 MG tablet Take 20 mg by mouth dailyHistorical Med      !! albuterol sulfate  (90 Base) MCG/ACT inhaler Inhale 2 puffs into the lungs every 6 hours as needed for Wheezing or Shortness of Breath, Disp-1 Inhaler, R-0Normal      prazosin (MINIPRESS) 5 MG capsule Take 5 mg by mouth nightlyHistorical Med      OXcarbazepine (TRILEPTAL) 300 MG tablet Take 300 mg by mouth 2 times dailyHistorical Med      loratadine-pseudoephedrine (CLARITIN-D 12 HOUR) 5-120 MG per extended release tablet Take 1 tablet by mouth 2 times daily, Disp-60 tablet,R-1Print      ibuprofen (ADVIL;MOTRIN) 800 MG tablet Take 1 tablet by mouth every 8 hours as needed for Pain, Disp-42 tablet,R-0Print      fluticasone-vilanterol (BREO ELLIPTA) 100-25 MCG/INH AEPB inhaler Inhale 1 puff into the lungs daily, Disp-60 each, R-0Normal      ipratropium-albuterol (DUONEB) 0.5-2.5 (3) MG/3ML SOLN nebulizer solution Take 3 mLs by nebulization every 6 hours, Disp-30 mL, R-0Print      pantoprazole (PROTONIX) 40 MG tablet Take 1 tablet by mouth daily, Disp-30 tablet, R-0Print      mesalamine (ASACOL HD) 800 MG TBEC TBEC tablet TAKE TWO TABLETS BY MOUTH THREE TIMES A DAY      fenofibrate 160 MG tablet Take 160 mg by mouth daily. aspirin 81 MG tablet Take 81 mg by mouth daily. niacin 50 MG tablet Take 50 mg by mouth daily (with breakfast). !! albuterol (PROVENTIL HFA;VENTOLIN HFA) 108 (90 BASE) MCG/ACT inhaler Inhale 2 puffs into the lungs every 6 hours as needed for Wheezing., Disp-1 Inhaler, R-0      losartan (COZAAR) 50 MG tablet Take 50 mg by mouth daily. metoprolol (LOPRESSOR) 25 MG tablet Take 25 mg by mouth 2 times daily. !! ALPRAZolam (XANAX) 1 MG tablet Take 0.5 mg by mouth 3 times daily as needed for Anxiety. Historical Med       !! - Potential duplicate medications found. Please discuss with provider. ALLERGIES     has No Known Allergies. FAMILY HISTORY     She indicated that her mother is alive. She indicated that her father is . She indicated that her maternal grandmother is . She indicated that the status of her daughter is unknown. She indicated that the status of her child is unknown.   family history includes Asthma in her child and mother; Breast Cancer in her mother; Cancer in her maternal grandmother; Heart Attack in her father; Heart Disease in her father and other family members; High Blood Pressure in her father; High Cholesterol in her father; Hypertension in an other family member; Other in her father; Stroke in her father; Ulcerative Colitis in her daughter. SOCIAL HISTORY    reports that she has been smoking cigarettes. She has a 10.00 pack-year smoking history. She has never used smokeless tobacco. She reports that she does not drink alcohol and does not use drugs. PHYSICAL EXAM     INITIAL VITALS:  temperature is 98.8 °F (37.1 °C). Her blood pressure is 151/63 (abnormal) and her pulse is 78. Her respiration is 18 and oxygen saturation is 98%.     Physical Exam  Vitals and nursing note reviewed. Constitutional:       General: She is not in acute distress. Appearance: Normal appearance. She is well-developed. She is ill-appearing. She is not toxic-appearing or diaphoretic. HENT:      Head: Normocephalic and atraumatic. Right Ear: Hearing normal.      Left Ear: Hearing normal.      Nose: Rhinorrhea present. Mouth/Throat:      Lips: Pink. Mouth: Mucous membranes are moist.      Pharynx: Uvula midline. Eyes:      General: Lids are normal. No scleral icterus. Extraocular Movements: Extraocular movements intact. Conjunctiva/sclera: Conjunctivae normal.      Pupils: Pupils are equal, round, and reactive to light. Neck:      Vascular: No JVD. Trachea: Trachea normal. No tracheal deviation. Cardiovascular:      Rate and Rhythm: Normal rate and regular rhythm. Heart sounds: Normal heart sounds. Pulmonary:      Effort: Pulmonary effort is normal. No respiratory distress. Breath sounds: Wheezing (Expiratory sparsely scattered throughout) present. No decreased breath sounds. Abdominal:      General: There is no distension. Palpations: Abdomen is soft. Abdomen is not rigid. There is no pulsatile mass. Tenderness: There is abdominal tenderness in the epigastric area. There is no guarding or rebound. Negative signs include Hubbard's sign and McBurney's sign. Hernia: No hernia is present. Musculoskeletal:         General: Normal range of motion. Cervical back: No rigidity. Comments: Movement normal as observed   Lymphadenopathy:      Cervical: No cervical adenopathy. Skin:     General: Skin is warm and dry. Coloration: Skin is not pale. Findings: No rash. Neurological:      General: No focal deficit present. Mental Status: She is alert and oriented to person, place, and time.       Gait: Gait normal.   Psychiatric:         Mood and Affect: Mood normal.         Speech: Speech normal.         Behavior: Behavior normal.         Thought Content: Thought content normal.         Cognition and Memory: Cognition normal.         DIFFERENTIAL DIAGNOSIS:   Including but not limited to: Food poisoning, gastroenteritis clinical dehydration, MILAN, Crohn's flare, Covid, influenza, arrhythmia    DIAGNOSTIC RESULTS     EKG: All EKG's are [facility] who either signs or Co-signs this chart in the absence of a cardiologist.  Ventricular Rate BPM 80    Atrial Rate BPM 80    P-R Interval ms 106    QRS Duration ms 120    Q-T Interval ms 390    QTc Calculation (Bazett) ms 449    P Axis degrees 14    R Axis degrees 50    T Axis degrees 13           Narrative & Impression    Sinus rhythm with short PA  Right bundle branch block  T wave abnormality, consider lateral ischemia  Abnormal ECG  No previous ECGs available               RADIOLOGY: non-plain film images(s) such as CT,Ultrasound and MRI are read by the radiologist.  Plain radiographic images are visualized and preliminarily interpreted by the emergency physician unless otherwise stated below. CT ABDOMEN PELVIS W IV CONTRAST Additional Contrast? None   Final Result   No acute abdominal or pelvic abnormalities            **This report has been created using voice recognition software. It may contain minor errors which are inherent in voice recognition technology. **      Final report electronically signed by Dr. Elizabeth Dangelo on 12/14/2021 4:33 PM      XR CHEST (2 VW)   Final Result      No acute intrathoracic process. **This report has been created using voice recognition software. It may contain minor errors which are inherent in voice recognition technology. **      Final report electronically signed by Dr. Kwame Spencer on 12/14/2021 3:24 PM          LABS:   Labs Reviewed   CBC WITH AUTO DIFFERENTIAL - Abnormal; Notable for the following components:       Result Value    WBC 15.6 (*)     RBC 5.63 (*)     Hemoglobin 17.2 (*)     Hematocrit 51.8 (*)     RDW-SD 45.8 (*)     Segs Absolute 13.7 (*)     Lymphocytes Absolute 0.7 (*)     Immature Grans (Abs) 0.09 (*)     All other components within normal limits   COMPREHENSIVE METABOLIC PANEL - Abnormal; Notable for the following components:    Glucose 128 (*)     BUN 27 (*)     All other components within normal limits   LIPASE - Abnormal; Notable for the following components:    Lipase 71.9 (*)     All other components within normal limits   GLOMERULAR FILTRATION RATE, ESTIMATED - Abnormal; Notable for the following components:    Est, Glom Filt Rate 64 (*)     All other components within normal limits   RAPID INFLUENZA A/B ANTIGENS   COVID-19, RAPID   TROPONIN   ANION GAP   OSMOLALITY       EMERGENCY DEPARTMENT COURSE:   Vitals:    Vitals:    12/14/21 1312 12/14/21 1744   BP: (!) 151/63    Pulse: 78    Resp: 18    Temp:  98.8 °F (37.1 °C)   TempSrc: Oral    SpO2: 98%        Patient was seen in the emergency department during the global pandemic, when there was surge capacity and regional healthcare crisis. MDM:  The patient was seen and evaluated by me in the [facility] area. Vital signs were reviewed and noted stable. Physical exam revealed an ill-appearing patient with epigastric abdominal tenderness. There is no guarding or rebound. Wheezes appreciated in the lung fields. Appropriate testing was ordered. Patient declined Covid testing. Results were reviewed by me upon completion. Results showed no acute intra-abdominal or intracardiothoracic process. There was leukocytosis of 15.6 which could be explained from vomiting. Patient was medicated with saline, Pepcid, Zofran, and later Reglan. Patient had significant improvement. She later became anxious and required a dose of her Xanax. On re-exam the patient's abdomen was soft and non-tender, with no peritoneal signs. Vital signs remained stable. The patient remained non-toxic appearing with no distress evident in the ER.   The patient demonstrated a steady independent gait. Results were discussed with the patient and discharge plan was discussed. Patient was deemed appropriate for discharge and patient was comfortable with plan of care. I have given the patient strict written and verbal instructions about care at home, follow-up, and signs and symptoms of worsening of condition and they did verbalize understanding. Medications   0.9 % sodium chloride bolus (0 mLs IntraVENous Stopped 12/14/21 1804)   famotidine (PEPCID) injection 20 mg (20 mg IntraVENous Given 12/14/21 1517)   ondansetron (ZOFRAN) injection 4 mg (4 mg IntraVENous Given 12/14/21 1518)   iopamidol (ISOVUE-370) 76 % injection 80 mL (80 mLs IntraVENous Given 12/14/21 1605)   metoclopramide (REGLAN) injection 10 mg (10 mg IntraVENous Given 12/14/21 1818)   ALPRAZolam (XANAX) tablet 0.5 mg (0.5 mg Oral Given 12/14/21 1818)     CRITICAL CARE:   None    CONSULTS:  None    PROCEDURES:  None    FINAL IMPRESSION      1. Gastroenteritis    2. History of Crohn's disease    3. Vasovagal near-syncope    4. AMRITA (generalized anxiety disorder)          DISPOSITION/PLAN     1. Gastroenteritis    2. History of Crohn's disease    3. Vasovagal near-syncope    4. AMRITA (generalized anxiety disorder)        PATIENT REFERRED TO:  FREDDY Ascencio - [facility]  [address]    Schedule an appointment as soon as possible for a visit       [facility] [address]  [place] [address]  [phone]  Schedule an appointment as soon as possible for a visit         DISCHARGE MEDICATIONS:  Discharge Medication List as of 12/14/2021  5:33 PM      START taking these medications    Details   ondansetron (ZOFRAN ODT) 4 MG disintegrating tablet Take 1 tablet by mouth every 8 hours as needed for Nausea, Disp-20 tablet, R-0Print      !! ALPRAZolam (XANAX) 0.5 MG tablet Take 1 tablet by mouth nightly as needed for Sleep or Anxiety for up to 30 days. , Disp-5 tablet, R-0Print       !! - Potential duplicate medications found. Please discuss with provider.           (Please note that portions of this note were completed with a voice recognition program.  Efforts were made to edit the dictations but occasionally words are mis-transcribed.)    Malachi Russo PA-C 12/19/21 4:47 AM    KEELY Bradford PA-C  12/19/21 [id number]

## 2021-12-19 ASSESSMENT — ENCOUNTER SYMPTOMS
RHINORRHEA: 1
BACK PAIN: 0
DIARRHEA: 1
EYE DISCHARGE: 1
SORE THROAT: 0
ABDOMINAL PAIN: 1
SHORTNESS OF BREATH: 1
PHOTOPHOBIA: 0
COUGH: 0
BLOOD IN STOOL: 1
VOMITING: 1
NAUSEA: 1

## 2023-01-03 ENCOUNTER — TELEPHONE (OUTPATIENT)
Dept: FAMILY MEDICINE CLINIC | Age: 63
End: 2023-01-03

## 2023-01-04 NOTE — TELEPHONE ENCOUNTER
Noted, would be happy to see her. Chart review revealed recent benzodiazepine prescriptions (last in September). Please notify patient I do not prescribe these type of medications but would work toward finding another treatment option for her.

## 2023-01-05 ENCOUNTER — OFFICE VISIT (OUTPATIENT)
Dept: FAMILY MEDICINE CLINIC | Age: 63
End: 2023-01-05
Payer: MEDICARE

## 2023-01-05 VITALS
HEART RATE: 109 BPM | BODY MASS INDEX: 27.99 KG/M2 | WEIGHT: 168 LBS | SYSTOLIC BLOOD PRESSURE: 132 MMHG | OXYGEN SATURATION: 96 % | DIASTOLIC BLOOD PRESSURE: 72 MMHG | HEIGHT: 65 IN

## 2023-01-05 DIAGNOSIS — K50.90 CROHN'S DISEASE WITHOUT COMPLICATION, UNSPECIFIED GASTROINTESTINAL TRACT LOCATION (HCC): ICD-10-CM

## 2023-01-05 DIAGNOSIS — J45.40 MODERATE PERSISTENT ASTHMA WITHOUT COMPLICATION: ICD-10-CM

## 2023-01-05 DIAGNOSIS — J44.9 CHRONIC OBSTRUCTIVE PULMONARY DISEASE, UNSPECIFIED COPD TYPE (HCC): ICD-10-CM

## 2023-01-05 DIAGNOSIS — Z72.0 TOBACCO USE: ICD-10-CM

## 2023-01-05 DIAGNOSIS — Z13.220 SCREENING FOR HYPERLIPIDEMIA: ICD-10-CM

## 2023-01-05 DIAGNOSIS — E55.9 VITAMIN D DEFICIENCY: ICD-10-CM

## 2023-01-05 DIAGNOSIS — F41.1 GAD (GENERALIZED ANXIETY DISORDER): Primary | ICD-10-CM

## 2023-01-05 DIAGNOSIS — I10 ESSENTIAL HYPERTENSION: ICD-10-CM

## 2023-01-05 DIAGNOSIS — Z86.39 HX OF NON ANEMIC VITAMIN B12 DEFICIENCY: ICD-10-CM

## 2023-01-05 DIAGNOSIS — F51.04 PSYCHOPHYSIOLOGICAL INSOMNIA: ICD-10-CM

## 2023-01-05 DIAGNOSIS — F33.1 MODERATE EPISODE OF RECURRENT MAJOR DEPRESSIVE DISORDER (HCC): ICD-10-CM

## 2023-01-05 DIAGNOSIS — L70.0 ACNE VULGARIS: ICD-10-CM

## 2023-01-05 PROBLEM — K64.5 INTERNAL THROMBOSED HEMORRHOIDS: Status: ACTIVE | Noted: 2022-02-22

## 2023-01-05 PROBLEM — F17.210 CIGARETTE NICOTINE DEPENDENCE WITHOUT COMPLICATION: Status: ACTIVE | Noted: 2020-06-17

## 2023-01-05 PROBLEM — M15.0 PRIMARY OSTEOARTHRITIS INVOLVING MULTIPLE JOINTS: Status: ACTIVE | Noted: 2020-06-17

## 2023-01-05 PROBLEM — K21.9 GASTROESOPHAGEAL REFLUX DISEASE WITHOUT ESOPHAGITIS: Status: ACTIVE | Noted: 2020-07-13

## 2023-01-05 PROBLEM — J32.9 CHRONIC SINUSITIS: Status: ACTIVE | Noted: 2022-02-22

## 2023-01-05 PROBLEM — F32.A DEPRESSIVE DISORDER: Status: ACTIVE | Noted: 2020-06-17

## 2023-01-05 PROBLEM — M15.9 PRIMARY OSTEOARTHRITIS INVOLVING MULTIPLE JOINTS: Status: ACTIVE | Noted: 2020-06-17

## 2023-01-05 PROCEDURE — 3075F SYST BP GE 130 - 139MM HG: CPT | Performed by: NURSE PRACTITIONER

## 2023-01-05 PROCEDURE — 4004F PT TOBACCO SCREEN RCVD TLK: CPT | Performed by: NURSE PRACTITIONER

## 2023-01-05 PROCEDURE — G8484 FLU IMMUNIZE NO ADMIN: HCPCS | Performed by: NURSE PRACTITIONER

## 2023-01-05 PROCEDURE — G8419 CALC BMI OUT NRM PARAM NOF/U: HCPCS | Performed by: NURSE PRACTITIONER

## 2023-01-05 PROCEDURE — 3078F DIAST BP <80 MM HG: CPT | Performed by: NURSE PRACTITIONER

## 2023-01-05 PROCEDURE — 3017F COLORECTAL CA SCREEN DOC REV: CPT | Performed by: NURSE PRACTITIONER

## 2023-01-05 PROCEDURE — 99205 OFFICE O/P NEW HI 60 MIN: CPT | Performed by: NURSE PRACTITIONER

## 2023-01-05 PROCEDURE — 3023F SPIROM DOC REV: CPT | Performed by: NURSE PRACTITIONER

## 2023-01-05 PROCEDURE — G8427 DOCREV CUR MEDS BY ELIG CLIN: HCPCS | Performed by: NURSE PRACTITIONER

## 2023-01-05 RX ORDER — PREDNISONE 20 MG/1
TABLET ORAL
Qty: 42 TABLET | Refills: 0 | Status: SHIPPED | OUTPATIENT
Start: 2023-01-05 | End: 2023-01-26

## 2023-01-05 RX ORDER — ALBUTEROL SULFATE 90 UG/1
2 AEROSOL, METERED RESPIRATORY (INHALATION) EVERY 6 HOURS PRN
Qty: 1 EACH | Refills: 5 | Status: SHIPPED | OUTPATIENT
Start: 2023-01-05

## 2023-01-05 RX ORDER — FLUTICASONE PROPIONATE AND SALMETEROL 250; 50 UG/1; UG/1
1 POWDER RESPIRATORY (INHALATION) EVERY 12 HOURS
COMMUNITY
End: 2023-01-05 | Stop reason: SDUPTHER

## 2023-01-05 RX ORDER — NICOTINE 21 MG/24HR
1 PATCH, TRANSDERMAL 24 HOURS TRANSDERMAL EVERY 24 HOURS
Qty: 42 PATCH | Refills: 0 | Status: SHIPPED | OUTPATIENT
Start: 2023-01-05 | End: 2023-02-16

## 2023-01-05 RX ORDER — PREDNISONE 20 MG/1
TABLET ORAL
Qty: 49 TABLET | Refills: 0 | Status: SHIPPED | OUTPATIENT
Start: 2023-01-05 | End: 2023-01-05

## 2023-01-05 RX ORDER — TRETINOIN 0.5 MG/G
CREAM TOPICAL
Qty: 1 EACH | Refills: 0 | Status: SHIPPED | OUTPATIENT
Start: 2023-01-05 | End: 2023-02-04

## 2023-01-05 RX ORDER — TRETINOIN 0.5 MG/G
1 CREAM TOPICAL NIGHTLY
COMMUNITY
Start: 2022-11-11

## 2023-01-05 RX ORDER — FLUTICASONE PROPIONATE AND SALMETEROL 250; 50 UG/1; UG/1
1 POWDER RESPIRATORY (INHALATION) EVERY 12 HOURS
Qty: 60 EACH | Refills: 2 | Status: SHIPPED | OUTPATIENT
Start: 2023-01-05 | End: 2023-04-05

## 2023-01-05 RX ORDER — MESALAMINE 800 MG/1
1600 TABLET, DELAYED RELEASE ORAL 3 TIMES DAILY
Qty: 180 TABLET | Refills: 2 | Status: SHIPPED | OUTPATIENT
Start: 2023-01-05 | End: 2023-04-05

## 2023-01-05 RX ORDER — CITALOPRAM 20 MG/1
20 TABLET ORAL EVERY MORNING
Qty: 30 TABLET | Refills: 0 | Status: SHIPPED | OUTPATIENT
Start: 2023-01-05 | End: 2023-02-04

## 2023-01-05 RX ORDER — NICOTINE 21 MG/24HR
1 PATCH, TRANSDERMAL 24 HOURS TRANSDERMAL EVERY 24 HOURS
Qty: 14 PATCH | Refills: 0 | Status: SHIPPED | OUTPATIENT
Start: 2023-01-05 | End: 2023-01-19

## 2023-01-05 RX ORDER — TRAZODONE HYDROCHLORIDE 100 MG/1
TABLET ORAL
COMMUNITY
Start: 2022-11-26 | End: 2023-01-05 | Stop reason: SDUPTHER

## 2023-01-05 RX ORDER — NICOTINE 21 MG/24HR
1 PATCH, TRANSDERMAL 24 HOURS TRANSDERMAL EVERY 24 HOURS
COMMUNITY

## 2023-01-05 RX ORDER — PREDNISONE 20 MG/1
20 TABLET ORAL 2 TIMES DAILY
COMMUNITY
End: 2023-01-05 | Stop reason: ALTCHOICE

## 2023-01-05 RX ORDER — FLUTICASONE PROPIONATE 50 MCG
2 SPRAY, SUSPENSION (ML) NASAL DAILY
COMMUNITY

## 2023-01-05 RX ORDER — TRAZODONE HYDROCHLORIDE 150 MG/1
150 TABLET ORAL NIGHTLY
Qty: 30 TABLET | Refills: 2 | Status: SHIPPED | OUTPATIENT
Start: 2023-01-05 | End: 2023-04-05

## 2023-01-05 RX ORDER — AMLODIPINE BESYLATE 10 MG/1
10 TABLET ORAL DAILY
Qty: 30 TABLET | Refills: 2 | Status: SHIPPED | OUTPATIENT
Start: 2023-01-05 | End: 2023-04-05

## 2023-01-05 SDOH — ECONOMIC STABILITY: FOOD INSECURITY: WITHIN THE PAST 12 MONTHS, THE FOOD YOU BOUGHT JUST DIDN'T LAST AND YOU DIDN'T HAVE MONEY TO GET MORE.: NEVER TRUE

## 2023-01-05 SDOH — ECONOMIC STABILITY: FOOD INSECURITY: WITHIN THE PAST 12 MONTHS, YOU WORRIED THAT YOUR FOOD WOULD RUN OUT BEFORE YOU GOT MONEY TO BUY MORE.: NEVER TRUE

## 2023-01-05 ASSESSMENT — PATIENT HEALTH QUESTIONNAIRE - PHQ9
SUM OF ALL RESPONSES TO PHQ QUESTIONS 1-9: 2
2. FEELING DOWN, DEPRESSED OR HOPELESS: 2
SUM OF ALL RESPONSES TO PHQ QUESTIONS 1-9: 2
1. LITTLE INTEREST OR PLEASURE IN DOING THINGS: 0
SUM OF ALL RESPONSES TO PHQ9 QUESTIONS 1 & 2: 2

## 2023-01-05 ASSESSMENT — ENCOUNTER SYMPTOMS
WHEEZING: 1
SINUS PRESSURE: 0
EYE DISCHARGE: 0
SHORTNESS OF BREATH: 1
EYE ITCHING: 0
ABDOMINAL PAIN: 1
EYE PAIN: 0
BLOOD IN STOOL: 0
EYE REDNESS: 0
COUGH: 1
COLOR CHANGE: 0
CONSTIPATION: 1
DIARRHEA: 1

## 2023-01-05 ASSESSMENT — SOCIAL DETERMINANTS OF HEALTH (SDOH): HOW HARD IS IT FOR YOU TO PAY FOR THE VERY BASICS LIKE FOOD, HOUSING, MEDICAL CARE, AND HEATING?: NOT HARD AT ALL

## 2023-01-05 NOTE — PROGRESS NOTES
SRPX [facility] PROFESSIONAL [facility]  [address] [facility]  Dept: [phone]  Dept Fax: [phone]: [phone]     Visit Date:  1/5/2023    Patient:  Alta May  YOB: 1960  Age: 58 y.o. Gender: female  BMI: Body mass index is 27.96 kg/m². Alta May, New patient, is being seen today for   Chief Complaint   Patient presents with    Established New Doctor     Needs medication refills,    . Assessment/Plan      1. AMRITA (generalized anxiety disorder)  - Chronic, uncontrolled  - Start SSRI treatment for uncontrolled anxiety  - Okay to start prn hydroxyzine as previously prescribed  - Informed patient I will not be prescribing benzodiazepines for treatment of anxiety  - Patient to follow up in 1 month, or sooner  - Referral to psychiatry placed  - [facility]  - citalopram (CELEXA) 20 MG tablet; Take 1 tablet by mouth every morning  Dispense: 30 tablet; Refill: 0    2. Moderate episode of recurrent major depressive disorder (HCC)  - Chronic, uncontrolled  - Start SSRI treatment for uncontrolled depression  - Patient to follow up in 1 month, or sooner  - Referral to psychiatry placed  - [facility]  - citalopram (CELEXA) 20 MG tablet; Take 1 tablet by mouth every morning  Dispense: 30 tablet; Refill: 0    3. Psychophysiological insomnia  - Chronic, uncontrolled  - Increase trazodone dose from 100 mg to 150 mg  - Informed patient I will not be prescribing benzodiazepines for treatment of anxiety  - Sleep maintenance strategies encouraged  - Declines sleep medicine referral, although she may benefit as she reports snoring and daytime fatigue  - traZODone (DESYREL) 150 MG tablet; Take 1 tablet by mouth nightly  Dispense: 30 tablet; Refill: 2    4.  Chronic obstructive pulmonary disease, unspecified COPD type ([address]  - Chronic, controlled  - Encouraged smoking cessation- nicotine supplementation rx'ed  - Continue current inhalers including advair, prn albuterol and prn duoneb  - [facility]    5. Moderate persistent asthma without complication  - Continue prn albuterol  - Referral placed to pulmonology  - albuterol sulfate HFA (PROVENTIL;VENTOLIN;PROAIR) 108 (90 Base) MCG/ACT inhaler; Inhale 2 puffs into the lungs every 6 hours as needed for Wheezing or Shortness of Breath  Dispense: 1 each; Refill: 5    6. Acne vulgaris  - Chronic, controlled  - Continue retinol cream  - tretinoin (RETIN-A) 0.05 % cream; Apply topically nightly. Dispense: 1 each; Refill: 0    7. Crohn's disease without complication, unspecified gastrointestinal tract location ([facility]  - Chronic, uncontrolled  - Restart mesalamine treatment as previously prescribed  - ST oral steroid for symptoms of a flare up  - Continue to follow with Dr. Florentino Stephen  - mesalamine (DELZICOL) 800 MG TBEC TBEC tablet; Take 2 tablets by mouth 3 times daily  Dispense: 180 tablet; Refill: 2  - predniSONE (DELTASONE) 20 MG tablet; Take 1 tablet by mouth in the morning, at noon, and at bedtime for 7 days, THEN 1 tablet 2 times daily for 7 days, THEN 1 tablet daily for 7 days. Dispense: 42 tablet; Refill: 0    8. Essential hypertension  - Chronic, controlled  - Continue metoprolol and amlodipine  - DASH diet, regular exercise encouraged  - metoprolol tartrate (LOPRESSOR) 25 MG tablet; Take 1 tablet by mouth 2 times daily  Dispense: 60 tablet; Refill: 2  - amLODIPine (NORVASC) 10 MG tablet; Take 1 tablet by mouth daily  Dispense: 30 tablet; Refill: 2  - CBC with Auto Differential; Future  - Comprehensive Metabolic Panel; Future    9. Tobacco use  - Discussed current and previous tobacco use. Smoking cessation education provided. Patient declines smoking cessation treatment at this time. Patient is in the action phase. Encouragement provided.  Consider book \"The Easy Way to Stop Smoking\" by Karuna Hopson.  - nicotine ([place]) 14 MG/24HR; Place 1 patch onto the skin every 24 hours for 14 days 21 mg/day for 6 weeks, followed by 14 mg/day for 2 weeks; finish with 7 mg/day for 2 weeks. Dispense: 14 patch; Refill: 0  - nicotine (NICODERM CQ) 21 MG/24HR; Place 1 patch onto the skin every 24 hours 21 mg/day for 6 weeks, followed by 14 mg/day for 2 weeks; finish with 7 mg/day for 2 weeks. Dispense: 42 patch; Refill: 0  - nicotine (NICODERM CQ) 7 MG/24HR; Place 1 patch onto the skin every 24 hours for 14 days 21 mg/day for 6 weeks, followed by 14 mg/day for 2 weeks; finish with 7 mg/day for 2 weeks. Dispense: 14 patch; Refill: 0  - nicotine polacrilex (NICORETTE) 2 MG gum; Take 1 each by mouth as needed for Smoking cessation Max 24 pieces/day. Dispense: 100 each; Refill: 3    10. Vitamin D deficiency  - Update blood work  - Vitamin D 25 Hydroxy; Future    11. Hx of non anemic vitamin B12 deficiency  - Update blood work  - Vitamin B12 & Folate; Future    12. Screening for hyperlipidemia  - Update blood work  - Lipid Panel; Future      Return in about 1 month (around 2/5/2023) for Anxiety, Depression. HPI:     Patient presents today for a new patient appointment. Former patient of Breanna Amato DO. Health maintenance reviewed. Medical history significant for hypertension, hyperlipidemia, Crohn's, COPD, asthma, anxiety and depression. Current specialists include Dr. Herminio Foster. Needs medication refills and updated blood work. Reports stability in chronic illnesses. Would like to continue on xanax. Last rx was from September (1 month supply). Using for anxiety and insomnia. Recently started on trazodone which has helped some but not working as well as xanax. Reports daily symptoms of anxiety and depression. After stopping xanax, patient reports she was prescribed oxcarbazepine for anxiety. States this helps some. After offering hydroxyzine for treatment of anxiety, patient presents rx of hydroxyzine from her purse.  States she has never used it but does have it. Asks if it would be easier just to restart low dose xanax. Patient unaware if she has ever been prescribed ssri treatments previously. No current pulmonologist but was previously following with someone who \"up and left and took all of my records. \" Reports a history of a pulmonary abscess. Reports current Crohn's flare. Bilateral lower quadrant pain, diarrhea and constipation. Patient reports running out of mesalamine, and believes this is why she is experiencing her current symptoms. Previously resolved with ST prednisone treatment. Denies presence ofblood in the stool. PHQ Scores 1/5/2023 2/7/2017   PHQ2 Score 2 0   PHQ9 Score 2 0     Interpretation of Total Score Depression Severity: 1-4 = Minimal depression, 5-9 = Mild depression, 10-14 = Moderate depression, 15-19 = Moderately severe depression, 20-27 = Severe depression    Medications    Current Outpatient Medications:     fluticasone (FLONASE) 50 MCG/ACT nasal spray, 2 sprays by Nasal route daily, Disp: , Rfl:     tretinoin (RETIN-A) 0.05 % cream, Apply 1 application topically nightly, Disp: , Rfl:     nicotine (NICODERM CQ) 21 MG/24HR, Place 1 patch onto the skin every 24 hours, Disp: , Rfl:     mesalamine (DELZICOL) 800 MG TBEC TBEC tablet, Take 2 tablets by mouth 3 times daily, Disp: 180 tablet, Rfl: 2    tretinoin (RETIN-A) 0.05 % cream, Apply topically nightly., Disp: 1 each, Rfl: 0    nicotine (NICODERM CQ) 14 MG/24HR, Place 1 patch onto the skin every 24 hours for 14 days 21 mg/day for 6 weeks, followed by 14 mg/day for 2 weeks; finish with 7 mg/day for 2 weeks. , Disp: 14 patch, Rfl: 0    nicotine (NICODERM CQ) 21 MG/24HR, Place 1 patch onto the skin every 24 hours 21 mg/day for 6 weeks, followed by 14 mg/day for 2 weeks; finish with 7 mg/day for 2 weeks. , Disp: 42 patch, Rfl: 0    nicotine (NICODERM CQ) 7 MG/24HR, Place 1 patch onto the skin every 24 hours for 14 days 21 mg/day for 6 weeks, followed by 14 mg/day for 2 weeks; finish with 7 mg/day for 2 weeks. , Disp: 14 patch, Rfl: 0    traZODone (DESYREL) 150 MG tablet, Take 1 tablet by mouth nightly, Disp: 30 tablet, Rfl: 2    metoprolol tartrate (LOPRESSOR) 25 MG tablet, Take 1 tablet by mouth 2 times daily, Disp: 60 tablet, Rfl: 2    fluticasone-salmeterol (ADVAIR DISKUS) 250-50 MCG/ACT AEPB diskus inhaler, Inhale 1 puff into the lungs in the morning and 1 puff in the evening., Disp: 60 each, Rfl: 2    amLODIPine (NORVASC) 10 MG tablet, Take 1 tablet by mouth daily, Disp: 30 tablet, Rfl: 2    albuterol sulfate HFA (PROVENTIL;VENTOLIN;PROAIR) 108 (90 Base) MCG/ACT inhaler, Inhale 2 puffs into the lungs every 6 hours as needed for Wheezing or Shortness of Breath, Disp: 1 each, Rfl: 5    nicotine polacrilex (NICORETTE) 2 MG gum, Take 1 each by mouth as needed for Smoking cessation Max 24 pieces/day., Disp: 100 each, Rfl: 3    citalopram (CELEXA) 20 MG tablet, Take 1 tablet by mouth every morning, Disp: 30 tablet, Rfl: 0    predniSONE (DELTASONE) 20 MG tablet, Take 1 tablet by mouth in the morning, at noon, and at bedtime for 7 days, THEN 1 tablet 2 times daily for 7 days, THEN 1 tablet daily for 7 days. , Disp: 42 tablet, Rfl: 0    ipratropium-albuterol (DUONEB) 0.5-2.5 (3) MG/3ML SOLN nebulizer solution, Take 3 mLs by nebulization every 6 hours, Disp: 30 mL, Rfl: 0    The patient has No Known Allergies. Past Medical History  Dimitri Menjivar  has a past medical history of Acne, Allergic rhinitis, Angular cheilitis, Asthma, COPD (chronic obstructive pulmonary disease) ([address] Crohn's disease ([address] Depression, GERD (gastroesophageal reflux disease), Hyperlipidemia, Hypertension, Insulin resistance, Pelvic pain, Pneumonia, and Xerosis of skin. Past Surgical History  The patient  has a past surgical history that includes ovarian cyst removal (approx 1998); Colonoscopy (4/17/06); Colonoscopy (9/29/03); Colonoscopy (9/98); other surgical history; Tubal ligation (Bilateral);  Breast surgery; and Hysterectomy (2011). Family History  This patient's family history includes Asthma in her child and mother; Breast Cancer in her mother; Cancer in her maternal grandmother; Heart Attack in her father; Heart Disease in her father and other family members; High Blood Pressure in her father; High Cholesterol in her father; Hypertension in an other family member; Other in her father; Stroke in her father; Ulcerative Colitis in her daughter. Social History  Alexus Fernandez  reports that she has been smoking cigarettes. She has a 20.00 pack-year smoking history. She has never used smokeless tobacco. She reports that she does not drink alcohol and does not use drugs. Health Maintenance:    Health maintenance reviewed. Health Maintenance   Topic Date Due    Depression Screen  Never done    HIV screen  Never done    Hepatitis C screen  Never done    Colorectal Cancer Screen  Never done    Shingles vaccine (1 of 2) Never done    Low dose CT lung screening  Never done    Breast cancer screen  01/09/2020    Annual Wellness Visit (AWV)  Never done    A1C test (Diabetic or Prediabetic)  10/29/2020    Flu vaccine (1) 08/01/2022    COVID-19 Vaccine (5 - Booster for Moderna series) 08/03/2022    Lipids  10/29/2024    Pneumococcal 0-64 years Vaccine (3 - PPSV23 if available, else PCV20) 12/09/2025    DTaP/Tdap/Td vaccine (2 - Td or Tdap) 06/20/2028    Hepatitis A vaccine  Aged Out    Hib vaccine  Aged Out    Meningococcal (ACWY) vaccine  Aged Out       Subjective/Objective:      Review of Systems   Constitutional:  Positive for fatigue. Negative for chills and fever. HENT:  Negative for congestion, ear pain, sinus pressure and sneezing. Eyes:  Negative for pain, discharge, redness and itching. Respiratory:  Positive for cough, shortness of breath and wheezing. Cardiovascular:  Negative for chest pain, palpitations and leg swelling.    Gastrointestinal:  Positive for abdominal pain (bilateral lower quadrants), constipation and diarrhea. Negative for blood in stool. Endocrine: Negative for polydipsia, polyphagia and polyuria. Genitourinary:  Negative for difficulty urinating and hematuria. Skin:  Negative for color change, pallor and rash. Allergic/Immunologic: Negative for environmental allergies and food allergies. Neurological:  Negative for dizziness, light-headedness, numbness and headaches. Psychiatric/Behavioral:  Positive for dysphoric mood and sleep disturbance. Negative for agitation, confusion and suicidal ideas. The patient is nervous/anxious. /72   Pulse (!) 109   Ht 5' 5\" (1.651 m)   Wt 168 lb (76.2 kg)   SpO2 96%   BMI 27.96 kg/m²     Physical Exam  Vitals and nursing note reviewed. Constitutional:       General: She is awake. Appearance: Normal appearance. HENT:      Head: Normocephalic and atraumatic. Right Ear: Hearing and external ear normal.      Left Ear: Hearing and external ear normal.      Nose: Nose normal. No congestion or rhinorrhea. Eyes:      General: Lids are normal.         Right eye: No discharge. Left eye: No discharge. Conjunctiva/sclera: Conjunctivae normal.   Neck:      Trachea: No tracheal deviation. Cardiovascular:      Rate and Rhythm: Normal rate and regular rhythm. Heart sounds: Normal heart sounds. No murmur heard. Pulmonary:      Effort: Pulmonary effort is normal. No respiratory distress. Breath sounds: Decreased air movement present. No stridor. No wheezing. Abdominal:       Musculoskeletal:      Cervical back: Full passive range of motion without pain. Skin:     General: Skin is dry. Coloration: Skin is not jaundiced or pale. Neurological:      General: No focal deficit present. Mental Status: She is alert. Mental status is at baseline. Psychiatric:         Mood and Affect: Affect normal. Mood is anxious. Behavior: Behavior is cooperative. Thought Content:  Thought content does not include suicidal ideation. Labs Reviewed 1/5/2023:    Lab Results   Component Value Date    WBC 15.6 (H) 12/14/2021    HGB 17.2 (H) 12/14/2021    HCT 51.8 (H) 12/14/2021     12/14/2021    CHOL 210 (H) 10/29/2019    TRIG 484 (H) 10/29/2019    HDL 35 10/29/2019    ALT 16 12/14/2021    AST 17 12/14/2021     12/14/2021    K 3.9 12/14/2021     12/14/2021    CREATININE 0.9 12/14/2021    BUN 27 (H) 12/14/2021    CO2 25 12/14/2021    TSH 2.160 10/29/2019    LABA1C 6.1 10/29/2019     Chart and OARRS reviewed. Last xanax rx was in September after a wean by previous pcp. PDMP Monitoring:    Last PDMP Letty Loera as Reviewed:  Review User Review Instant Review Result   [facility] RIN Co[facility]ola 1/3/2023  4:53 PM Reviewed PDMP [1]     Last Controlled Substance Monitoring Documentation      Flowsheet Row [facility]14/[facility]21 [facility]   Comments to lobby via ambulation with family gait steady filed at 06/14/2021 1608          Urine Drug Screenings (1 yr)       Urine Drug Screen  Collected: 4/3/2021  4:37 PM (Final result)              Urine Drug Screen, Comprehensive  Collected: 3/1/2021  7:56 PM (Final result)              Urine Drug Screen, Comprehensive  Collected: 10/29/2019 12:52 PM (Final result)                  Medication Contract and Consent for Opioid Use Documents Filed        No documents found                      On this date 1/5/2023 I have spent 65 minutes reviewing previous notes, test results and face to face with the patient discussing the diagnosis and importance of compliance with the treatment plan as well as documenting on the day of the visit. Patient given educational materials - see patient instructions. Discussed use, benefit, and side effects of prescribed medications. All patient questions answered. Pt voiced understanding. Reviewed health maintenance.        Electronically signed by FREDDY Mauro CNP on 1/5/2023 at 9:55 AM EST

## 2023-02-08 ENCOUNTER — HOSPITAL ENCOUNTER (EMERGENCY)
Age: 63
Discharge: HOME | End: 2023-02-08
Payer: MEDICARE

## 2023-02-08 ENCOUNTER — APPOINTMENT (OUTPATIENT)
Dept: GENERAL RADIOLOGY | Age: 63
End: 2023-02-08
Payer: MEDICARE

## 2023-02-08 VITALS
HEIGHT: 63 IN | RESPIRATION RATE: 16 BRPM | SYSTOLIC BLOOD PRESSURE: 151 MMHG | DIASTOLIC BLOOD PRESSURE: 85 MMHG | BODY MASS INDEX: 29.23 KG/M2 | HEART RATE: 77 BPM | WEIGHT: 165 LBS | OXYGEN SATURATION: 96 % | TEMPERATURE: 98.6 F

## 2023-02-08 DIAGNOSIS — S69.92XA INJURY OF LEFT HAND, INITIAL ENCOUNTER: Primary | ICD-10-CM

## 2023-02-08 PROCEDURE — 73130 X-RAY EXAM OF HAND: CPT

## 2023-02-08 PROCEDURE — 99213 OFFICE O/P EST LOW 20 MIN: CPT

## 2023-02-08 PROCEDURE — 29130 APPL FINGER SPLINT STATIC: CPT

## 2023-02-08 PROCEDURE — 2709999900 HC NON-CHARGEABLE SUPPLY

## 2023-02-08 PROCEDURE — 99212 OFFICE O/P EST SF 10 MIN: CPT | Performed by: NURSE PRACTITIONER

## 2023-02-08 ASSESSMENT — PAIN SCALES - GENERAL: PAINLEVEL_OUTOF10: 2

## 2023-02-08 ASSESSMENT — PAIN DESCRIPTION - DESCRIPTORS: DESCRIPTORS: ACHING

## 2023-02-08 ASSESSMENT — ENCOUNTER SYMPTOMS
VOMITING: 0
NAUSEA: 0
SORE THROAT: 0
DIARRHEA: 0
RHINORRHEA: 0
COUGH: 0
CHEST TIGHTNESS: 0
SHORTNESS OF BREATH: 0

## 2023-02-08 ASSESSMENT — PAIN - FUNCTIONAL ASSESSMENT
PAIN_FUNCTIONAL_ASSESSMENT: PREVENTS OR INTERFERES SOME ACTIVE ACTIVITIES AND ADLS
PAIN_FUNCTIONAL_ASSESSMENT: 0-10

## 2023-02-08 ASSESSMENT — PAIN DESCRIPTION - ORIENTATION: ORIENTATION: LEFT

## 2023-02-08 ASSESSMENT — PAIN DESCRIPTION - ONSET: ONSET: SUDDEN

## 2023-02-08 ASSESSMENT — PAIN DESCRIPTION - PAIN TYPE: TYPE: ACUTE PAIN

## 2023-02-08 ASSESSMENT — PAIN DESCRIPTION - FREQUENCY: FREQUENCY: CONTINUOUS

## 2023-02-08 NOTE — ED PROVIDER NOTES
[address]  Urgent Care Encounter       CHIEF COMPLAINT       Chief Complaint   Patient presents with    Hand Injury     Gabby Abarca about a month ago and injured left hand       Nurses Notes reviewed and I agree except as noted in the HPI. HISTORY OF PRESENT ILLNESS   Greg Jackson is a 58 y.o. female who presents to the [facility] ambulatory care center for evaluation of a left hand injury. She reports the injury occurred roughly 1 month ago. She reports that she fell. She is complaining of left index finger pain with worsening pain with range of motion. The history is provided by the patient. No  was used. REVIEW OF SYSTEMS     Review of Systems   Constitutional:  Negative for activity change, appetite change, chills, fatigue and fever. HENT:  Negative for ear discharge, ear pain, rhinorrhea and sore throat. Respiratory:  Negative for cough, chest tightness and shortness of breath. Cardiovascular:  Negative for chest pain. Gastrointestinal:  Negative for diarrhea, nausea and vomiting. Genitourinary:  Negative for dysuria. Musculoskeletal:  Positive for arthralgias. Skin:  Negative for rash. Allergic/Immunologic: Negative for environmental allergies and food allergies. Neurological:  Negative for dizziness and headaches. PAST MEDICAL HISTORY         Diagnosis Date    Acne     Allergic rhinitis     Angular cheilitis     Asthma     COPD (chronic obstructive pulmonary disease) (HCC)     Crohn's disease (HCC)     Depression     GERD (gastroesophageal reflux disease)     Hyperlipidemia     Hypertension     Insulin resistance     Pelvic pain     question polycystic ovarian syndrome    Pneumonia     Xerosis of skin     feet       SURGICALHISTORY     Patient  has a past surgical history that includes ovarian cyst removal (approx 1998); Colonoscopy (4/17/06); Colonoscopy (9/29/03); Colonoscopy (9/98); other surgical history;  Tubal ligation (Bilateral); Breast surgery; and Hysterectomy (2011). CURRENT MEDICATIONS       Previous Medications    ALBUTEROL SULFATE HFA (PROVENTIL;VENTOLIN;PROAIR) 108 (90 BASE) MCG/ACT INHALER    Inhale 2 puffs into the lungs every 6 hours as needed for Wheezing or Shortness of Breath    AMLODIPINE (NORVASC) 10 MG TABLET    Take 1 tablet by mouth daily    CITALOPRAM (CELEXA) 20 MG TABLET    Take 1 tablet by mouth every morning    FLUTICASONE (FLONASE) 50 MCG/ACT NASAL SPRAY    2 sprays by Nasal route daily    FLUTICASONE-SALMETEROL (ADVAIR DISKUS) 250-50 MCG/ACT AEPB DISKUS INHALER    Inhale 1 puff into the lungs in the morning and 1 puff in the evening. IPRATROPIUM-ALBUTEROL (DUONEB) 0.5-2.5 (3) MG/3ML SOLN NEBULIZER SOLUTION    Take 3 mLs by nebulization every 6 hours    MESALAMINE (DELZICOL) 800 MG TBEC TBEC TABLET    Take 2 tablets by mouth 3 times daily    METOPROLOL TARTRATE (LOPRESSOR) 25 MG TABLET    Take 1 tablet by mouth 2 times daily    NICOTINE (NICODERM CQ) 14 MG/24HR    Place 1 patch onto the skin every 24 hours for 14 days 21 mg/day for 6 weeks, followed by 14 mg/day for 2 weeks; finish with 7 mg/day for 2 weeks. NICOTINE (NICODERM CQ) 21 MG/24HR    Place 1 patch onto the skin every 24 hours    NICOTINE (NICODERM CQ) 21 MG/24HR    Place 1 patch onto the skin every 24 hours 21 mg/day for 6 weeks, followed by 14 mg/day for 2 weeks; finish with 7 mg/day for 2 weeks. NICOTINE (NICODERM CQ) 7 MG/24HR    Place 1 patch onto the skin every 24 hours for 14 days 21 mg/day for 6 weeks, followed by 14 mg/day for 2 weeks; finish with 7 mg/day for 2 weeks. NICOTINE POLACRILEX (NICORETTE) 2 MG GUM    Take 1 each by mouth as needed for Smoking cessation Max 24 pieces/day. TRAZODONE (DESYREL) 150 MG TABLET    Take 1 tablet by mouth nightly    TRETINOIN (RETIN-A) 0.05 % CREAM    Apply 1 application topically nightly       ALLERGIES     Patient is has No Known Allergies.     Patients   Immunization History   Administered Date(s) Administered    COVID-19, MODERNA BLUE border, Primary or Immunocompromised, (age 12y+), IM, 100 mcg/0.5mL 03/11/2021, 04/08/2021    COVID-19, MODERNA Booster BLUE border, (age 18y+), IM, 50mcg/0.25mL 11/19/2021, 06/08/2022    Influenza A ([id number]) Vaccine PF IM 11/13/2009    Influenza Virus Vaccine 09/28/2004    Influenza, AFLURIA, Cindia Angst, (age 10-32 m), PF 09/14/2016    Influenza, FLUARIX, FLULAVAL, FLUZONE (age 10 mo+) AND AFLURIA, (age 1 y+), PF, 0.5mL 09/07/2017, 09/04/2020, 01/14/2022    Influenza, FLUBLOK, (age 25 y+), PF, 0.5mL 12/13/2018, 12/17/2018    Influenza, High Dose (Fluzone 65 yrs and older) 03/03/2020    Pneumococcal Conjugate 13-valent ([id number]) 10/04/2013    Pneumococcal Polysaccharide ([id number]) 02/11/2017, 04/26/2019    Tdap (Boostrix, Adacel) 06/20/2018       FAMILY HISTORY     Patient's family history includes Asthma in her child and mother; Breast Cancer in her mother; Cancer in her maternal grandmother; Heart Attack in her father; Heart Disease in her father and other family members; High Blood Pressure in her father; High Cholesterol in her father; Hypertension in an other family member; Other in her father; Stroke in her father; Ulcerative Colitis in her daughter. SOCIAL HISTORY     Patient  reports that she has been smoking cigarettes. She has a 20.00 pack-year smoking history. She has never used smokeless tobacco. She reports that she does not drink alcohol and does not use drugs. PHYSICAL EXAM     ED TRIAGE VITALS  BP: (!) 151/85, Temp: 98.6 °F (37 °C), Heart Rate: 77, Resp: 16, SpO2: 96 %,Estimated body mass index is 29.23 kg/m² as calculated from the following:    Height as of this encounter: 5' 3\" (1.6 m). Weight as of this encounter: 165 lb (74.8 kg). ,No LMP recorded. Patient has had a hysterectomy. Physical Exam  Vitals and nursing note reviewed. Constitutional:       General: She is not in acute distress. Appearance: Normal appearance.  She is not ill-appearing, toxic-appearing or diaphoretic. HENT:      Head: Normocephalic. Right Ear: Ear canal and external ear normal.      Left Ear: Ear canal and external ear normal.      Nose: Nose normal. No congestion or rhinorrhea. Mouth/Throat:      Mouth: Mucous membranes are moist.      Pharynx: Oropharynx is clear. No oropharyngeal exudate or posterior oropharyngeal erythema. Cardiovascular:      Rate and Rhythm: Normal rate. Pulses: Normal pulses. Pulmonary:      Effort: Pulmonary effort is normal. No respiratory distress. Breath sounds: No stridor. No wheezing or rhonchi. Abdominal:      General: Abdomen is flat. Bowel sounds are normal.      Palpations: Abdomen is soft. Musculoskeletal:         General: No swelling or tenderness. Normal range of motion. Arms:       Cervical back: Normal range of motion. Neurological:      General: No focal deficit present. Mental Status: She is alert and oriented to person, place, and time. Psychiatric:         Mood and Affect: Mood normal.         Behavior: Behavior normal.       DIAGNOSTIC RESULTS     Labs:No results found for this visit on 02/08/23. IMAGING:    XR HAND LEFT (MIN 3 VIEWS)   Final Result   1. No acute bony abnormality            **This report has been created using voice recognition software. It may contain minor errors which are inherent in voice recognition technology. **      Final report electronically signed by Dr Jenny Fernández on 2/8/2023 7:11 PM            EKG: None      URGENT CARE COURSE:     Vitals:    02/08/23 1828   BP: (!) 151/85   Pulse: 77   Resp: 16   Temp: 98.6 °F (37 °C)   TempSrc: Temporal   SpO2: 96%   Weight: 165 lb (74.8 kg)   Height: 5' 3\" (1.6 m)       Medications - No data to display         PROCEDURES:  None    FINAL IMPRESSION      1. Injury of left hand, initial encounter          DISPOSITION/ PLAN     Patient seen and evaluated for a left hand injury.   An x-ray was completed with no acute findings. Patient is placed in a AlumaFoam splint. Instructed to use over-the-counter Tylenol and Motrin for pain or fever. Instructed to follow-up with Ortho in 5 to 7 days with continued pain. She is agreeable with the above plan and denies questions or concerns at this time.       PATIENT REFERRED TO:  FREDDY Stevens CNP  [facility] [address] [place] [address]      DISCHARGE MEDICATIONS:  New Prescriptions    No medications on file       Discontinued Medications    No medications on file       Current Discharge Medication List          FREDDY Gaffney CNP    (Please note that portions of this note were completed with a voice recognition program. Efforts were made to edit the dictations but occasionally words are mis-transcribed.)           FREDDY Gaffney CNP  02/08/23 [id number]

## 2023-02-09 NOTE — ED NOTES
PT GIVEN DISCHARGE INSTRUCTIONS, VERBALIZES UNDERSTANDING. PT ASSESSMENT UNCHANGED, DISCHARGED IN STABLE CONDITION.          Aniket Roca, KRISS  02/08/23 [id number]

## 2023-03-14 ENCOUNTER — OFFICE VISIT (OUTPATIENT)
Dept: FAMILY MEDICINE CLINIC | Age: 63
End: 2023-03-14
Payer: MEDICARE

## 2023-03-14 VITALS
BODY MASS INDEX: 29.76 KG/M2 | HEART RATE: 84 BPM | WEIGHT: 168 LBS | DIASTOLIC BLOOD PRESSURE: 78 MMHG | SYSTOLIC BLOOD PRESSURE: 126 MMHG | RESPIRATION RATE: 20 BRPM

## 2023-03-14 DIAGNOSIS — L70.0 ACNE VULGARIS: ICD-10-CM

## 2023-03-14 DIAGNOSIS — E78.1 HYPERTRIGLYCERIDEMIA: ICD-10-CM

## 2023-03-14 DIAGNOSIS — F33.1 MODERATE EPISODE OF RECURRENT MAJOR DEPRESSIVE DISORDER (HCC): ICD-10-CM

## 2023-03-14 DIAGNOSIS — J44.9 CHRONIC OBSTRUCTIVE PULMONARY DISEASE, UNSPECIFIED COPD TYPE (HCC): ICD-10-CM

## 2023-03-14 DIAGNOSIS — J40 BRONCHITIS: Primary | ICD-10-CM

## 2023-03-14 DIAGNOSIS — Z72.0 TOBACCO USE: ICD-10-CM

## 2023-03-14 DIAGNOSIS — F41.1 GAD (GENERALIZED ANXIETY DISORDER): ICD-10-CM

## 2023-03-14 DIAGNOSIS — Z91.199 POOR COMPLIANCE: ICD-10-CM

## 2023-03-14 PROBLEM — F17.200 TOBACCO DEPENDENCE SYNDROME: Status: ACTIVE | Noted: 2020-06-17

## 2023-03-14 PROCEDURE — 4004F PT TOBACCO SCREEN RCVD TLK: CPT | Performed by: NURSE PRACTITIONER

## 2023-03-14 PROCEDURE — 3078F DIAST BP <80 MM HG: CPT | Performed by: NURSE PRACTITIONER

## 2023-03-14 PROCEDURE — 3017F COLORECTAL CA SCREEN DOC REV: CPT | Performed by: NURSE PRACTITIONER

## 2023-03-14 PROCEDURE — 3074F SYST BP LT 130 MM HG: CPT | Performed by: NURSE PRACTITIONER

## 2023-03-14 PROCEDURE — G8427 DOCREV CUR MEDS BY ELIG CLIN: HCPCS | Performed by: NURSE PRACTITIONER

## 2023-03-14 PROCEDURE — 99214 OFFICE O/P EST MOD 30 MIN: CPT | Performed by: NURSE PRACTITIONER

## 2023-03-14 PROCEDURE — 3023F SPIROM DOC REV: CPT | Performed by: NURSE PRACTITIONER

## 2023-03-14 PROCEDURE — G8484 FLU IMMUNIZE NO ADMIN: HCPCS | Performed by: NURSE PRACTITIONER

## 2023-03-14 PROCEDURE — G8419 CALC BMI OUT NRM PARAM NOF/U: HCPCS | Performed by: NURSE PRACTITIONER

## 2023-03-14 RX ORDER — NICOTINE 21 MG/24HR
1 PATCH, TRANSDERMAL 24 HOURS TRANSDERMAL EVERY 24 HOURS
Qty: 42 PATCH | Refills: 0 | Status: SHIPPED | OUTPATIENT
Start: 2023-03-14 | End: 2023-04-25

## 2023-03-14 RX ORDER — CITALOPRAM 20 MG/1
20 TABLET ORAL EVERY MORNING
Qty: 30 TABLET | Refills: 0 | Status: SHIPPED | OUTPATIENT
Start: 2023-03-14 | End: 2023-04-13

## 2023-03-14 RX ORDER — BUDESONIDE AND FORMOTEROL FUMARATE DIHYDRATE 160; 4.5 UG/1; UG/1
2 AEROSOL RESPIRATORY (INHALATION) 2 TIMES DAILY
Qty: 10.2 G | Refills: 3 | Status: SHIPPED | OUTPATIENT
Start: 2023-03-14

## 2023-03-14 RX ORDER — POLYETHYLENE GLYCOL 3350 17 G
2 POWDER IN PACKET (EA) ORAL PRN
Qty: 100 EACH | Refills: 3 | Status: SHIPPED | OUTPATIENT
Start: 2023-03-14

## 2023-03-14 RX ORDER — AZITHROMYCIN 250 MG/1
250 TABLET, FILM COATED ORAL DAILY
Qty: 1 PACKET | Refills: 0 | Status: SHIPPED | OUTPATIENT
Start: 2023-03-14

## 2023-03-14 RX ORDER — FENOFIBRATE 160 MG/1
160 TABLET ORAL DAILY
Qty: 60 TABLET | Refills: 0 | Status: SHIPPED | OUTPATIENT
Start: 2023-03-14 | End: 2023-05-13

## 2023-03-14 RX ORDER — TRETINOIN 1 MG/G
CREAM TOPICAL
Qty: 1 EACH | Refills: 2 | Status: SHIPPED | OUTPATIENT
Start: 2023-03-14

## 2023-03-14 SDOH — ECONOMIC STABILITY: HOUSING INSECURITY
IN THE LAST 12 MONTHS, WAS THERE A TIME WHEN YOU DID NOT HAVE A STEADY PLACE TO SLEEP OR SLEPT IN A SHELTER (INCLUDING NOW)?: NO

## 2023-03-14 SDOH — ECONOMIC STABILITY: FOOD INSECURITY: WITHIN THE PAST 12 MONTHS, THE FOOD YOU BOUGHT JUST DIDN'T LAST AND YOU DIDN'T HAVE MONEY TO GET MORE.: NEVER TRUE

## 2023-03-14 SDOH — ECONOMIC STABILITY: INCOME INSECURITY: HOW HARD IS IT FOR YOU TO PAY FOR THE VERY BASICS LIKE FOOD, HOUSING, MEDICAL CARE, AND HEATING?: NOT HARD AT ALL

## 2023-03-14 SDOH — ECONOMIC STABILITY: FOOD INSECURITY: WITHIN THE PAST 12 MONTHS, YOU WORRIED THAT YOUR FOOD WOULD RUN OUT BEFORE YOU GOT MONEY TO BUY MORE.: NEVER TRUE

## 2023-03-14 ASSESSMENT — ENCOUNTER SYMPTOMS
SINUS PRESSURE: 1
SHORTNESS OF BREATH: 1
SORE THROAT: 0
CHEST TIGHTNESS: 1
COUGH: 1
WHEEZING: 1
ROS SKIN COMMENTS: ACNE

## 2023-03-14 NOTE — PATIENT INSTRUCTIONS
[address] [id number] [facility], [address] [facility], [address]   [address] [facility]  [id number] [place] [address], [facility] IRENA HARRIS II.DANIEL, [address]

## 2023-03-14 NOTE — PROGRESS NOTES
Maritza Brown (:  1960) is a 58 y.o. female,Established patient, here for evaluation of the following chief complaint(s):  Follow-up (Anxiety and depression )         ASSESSMENT/PLAN:  1. Bronchitis  - ACute  - Start oral antibiotic for suspected bronchitis  - Continue prednisone for total of 5 days, 40 mg per day  -     azithromycin (ZITHROMAX) 250 MG tablet; Take 1 tablet by mouth daily Day 1 take 2 tablets. Days 2-5 take 1 tablet., Disp-1 packet, R-0Normal    2. Acne vulgaris  -     tretinoin (RETIN-A) 0.1 % cream; Apply topically nightly., Disp-1 each, R-2, Normal  3. Tobacco use  -     nicotine polacrilex (NICORETTE) 2 MG lozenge; Take 1 lozenge by mouth as needed for Smoking cessation, Disp-100 each, R-3Normal  -     nicotine (NICODERM CQ) 21 MG/24HR; Place 1 patch onto the skin every 24 hours 21 mg/day for 6 weeks, followed by 14 mg/day for 2 weeks; finish with 7 mg/day for 2 weeks. , Disp-42 patch, R-0Normal  4. AMRITA (generalized anxiety disorder)  - Chronic, uncontrolled  - Restart citalopram  - Medication compliance encouraged  -     citalopram (CELEXA) 20 MG tablet; Take 1 tablet by mouth every morning, Disp-30 tablet, R-0Normal  5. Moderate episode of recurrent major depressive disorder (HCC)  - Chronic, uncontrolled  - Restart citalopram  - Medication compliance encouraged  -     citalopram (CELEXA) 20 MG tablet; Take 1 tablet by mouth every morning, Disp-30 tablet, R-0Normal  6. Chronic obstructive pulmonary disease, unspecified COPD type ([facility] [address]  - Start Symbicort twice daily  - Smoking cessation again encouraged  - Follow up with pulmonology  -     budesonide-formoterol (SYMBICORT) 160-4.5 MCG/ACT AERO; Inhale 2 puffs into the lungs 2 times daily, Disp-10.2 g, R-3Normal  7. Hypertriglyceridemia  -     fenofibrate (TRIGLIDE) 160 MG tablet; Take 1 tablet by mouth daily, Disp-60 tablet, R-0Normal  8. Poor compliance  - Encouraged complication with POC.  Patient did not complete ordered blood work, schedule specialists appt or take medications as prescribed. Specialty office phone numbers provided. Encouraged patient to call today to schedule appt. Return in about 2 months (around 5/14/2023) for Chronic Conditions. Subjective   SUBJECTIVE/OBJECTIVE:  Patient presents for 1 month follow up for anxiety and depression. Patient was started on citalopram, with prn hydroxyzine and nightly trazodone during her last visit. Patient states she has not been consistent taking her citalopram. Has missed multiple doses of citalopram. Anxiety and depression have not improved. Patient would like her retin-a cream strength increased from . 5% to 1%. States she has been on that dose previously which helped with her cystic acne. Reports some medication difficulties with Advair. Would like to trial Symbicort. Has previously used with success. Started her nicotine patches but did not follow through with dosing. Would like to try again. Has not scheduled her pulmonology or psychiatry appointments. States she has not been contacted for scheduling. Chart review reveals multiple calls from each specialty office for scheduling. Cough  This is a new problem. The current episode started in the past 7 days. The problem has been unchanged. Associated symptoms include nasal congestion, postnasal drip, shortness of breath and wheezing. Pertinent negatives include no fever or sore throat. Review of Systems   Constitutional:  Negative for fever. HENT:  Positive for postnasal drip and sinus pressure. Negative for sore throat. Respiratory:  Positive for cough, chest tightness, shortness of breath and wheezing. Skin:         Acne   Psychiatric/Behavioral:  Positive for dysphoric mood and sleep disturbance. The patient is nervous/anxious. Objective   Physical Exam  Vitals and nursing note reviewed. Constitutional:       General: She is awake. Appearance: Normal appearance.    HENT: Head: Normocephalic and atraumatic. Right Ear: Hearing and external ear normal.      Left Ear: Hearing and external ear normal.      Nose: Nose normal. No congestion or rhinorrhea. Eyes:      General: Lids are normal.         Right eye: No discharge. Left eye: No discharge. Conjunctiva/sclera: Conjunctivae normal.   Neck:      Trachea: No tracheal deviation. Cardiovascular:      Rate and Rhythm: Normal rate and regular rhythm. Heart sounds: Normal heart sounds. No murmur heard. Pulmonary:      Effort: Pulmonary effort is normal. No respiratory distress. Breath sounds: No stridor. No wheezing. Musculoskeletal:      Cervical back: Full passive range of motion without pain. Skin:     General: Skin is dry. Coloration: Skin is not jaundiced or pale. Neurological:      General: No focal deficit present. Mental Status: She is alert. Mental status is at baseline. Psychiatric:         Mood and Affect: Mood and affect normal.         Behavior: Behavior is cooperative. On this date 3/14/2023 I have spent 30 minutes reviewing previous notes, test results and face to face with the patient discussing the diagnosis and importance of compliance with the treatment plan as well as documenting on the day of the visit. An electronic signature was used to authenticate this note.     --FREDDY Styles - CNP

## 2023-04-20 ENCOUNTER — TELEPHONE (OUTPATIENT)
Dept: FAMILY MEDICINE CLINIC | Age: 63
End: 2023-04-20

## 2023-04-26 NOTE — TELEPHONE ENCOUNTER
I tried to call patient to inform her of what mark had mentioned and she did not answer and I was unable to leave a message due to VM not being set up.

## 2023-04-27 NOTE — TELEPHONE ENCOUNTER
Patient called back in and I informed her on what mark had mention to reach out to Dr. Michael Falcon. She stated okay and will be giving him a call.

## 2023-06-24 DIAGNOSIS — J45.40 MODERATE PERSISTENT ASTHMA WITHOUT COMPLICATION: ICD-10-CM

## 2023-06-24 DIAGNOSIS — E78.1 HYPERTRIGLYCERIDEMIA: ICD-10-CM

## 2023-06-24 DIAGNOSIS — L70.0 ACNE VULGARIS: ICD-10-CM

## 2023-06-26 RX ORDER — TRETINOIN 1 MG/G
CREAM TOPICAL
Qty: 45 G | Refills: 0 | OUTPATIENT
Start: 2023-06-26

## 2023-06-26 RX ORDER — ALBUTEROL SULFATE 90 UG/1
AEROSOL, METERED RESPIRATORY (INHALATION)
Qty: 9 G | Refills: 0 | OUTPATIENT
Start: 2023-06-26

## 2023-06-26 RX ORDER — FENOFIBRATE 160 MG/1
TABLET ORAL
Qty: 60 TABLET | Refills: 0 | OUTPATIENT
Start: 2023-06-26

## 2023-07-27 ENCOUNTER — APPOINTMENT (OUTPATIENT)
Dept: GENERAL RADIOLOGY | Age: 63
End: 2023-07-27
Payer: MEDICARE

## 2023-07-27 ENCOUNTER — HOSPITAL ENCOUNTER (EMERGENCY)
Age: 63
Discharge: HOME | End: 2023-07-27
Payer: MEDICARE

## 2023-07-27 VITALS
RESPIRATION RATE: 16 BRPM | TEMPERATURE: 98 F | WEIGHT: 168 LBS | BODY MASS INDEX: 29.76 KG/M2 | HEART RATE: 77 BPM | OXYGEN SATURATION: 96 %

## 2023-07-27 DIAGNOSIS — M79.645 FINGER PAIN, LEFT: Primary | ICD-10-CM

## 2023-07-27 PROCEDURE — 73120 X-RAY EXAM OF HAND: CPT

## 2023-07-27 PROCEDURE — 99283 EMERGENCY DEPT VISIT LOW MDM: CPT

## 2023-07-27 ASSESSMENT — PAIN - FUNCTIONAL ASSESSMENT: PAIN_FUNCTIONAL_ASSESSMENT: 0-10

## 2023-07-27 ASSESSMENT — PAIN SCALES - GENERAL: PAINLEVEL_OUTOF10: 7

## 2023-07-27 NOTE — DISCHARGE INSTRUCTIONS
Rest, stay well-hydrated. Over-the-counter Tylenol as needed for pain. Voltaren gel 4 times daily as needed for pain. Rest, ice, elevate frequently. Follow-up with [facility] for further evaluation and care. Please call to make an appointment. If any worsening or concerns return to the ER immediately.

## 2023-07-27 NOTE — ED TRIAGE NOTES
Pt to ED with left index finger pain. Pt states that about 6-8 months ago she fell and jammed her finger. Pt states that urgent care told he she did not break it. Pt states that she is unable to straighten the finger and is having a lot of pain in the joint.

## 2023-07-27 NOTE — ED PROVIDER NOTES
[address] [facility] EMERGENCY DEPT      EMERGENCY MEDICINE     Pt Name: Dorothy Teresa  MRN: [id number]  Birthdate 1960  Date of evaluation: 7/27/2023  Provider: FREDDY Miller CNP    CHIEF COMPLAINT       Chief Complaint   Patient presents with    Finger Pain     HISTORY OF PRESENT ILLNESS   Dorothy Teresa is a pleasant 58 y.o. female who presents to the emergency department from from home, by private vehicle for evaluation of left index finger pain. On 2/8/2023, patient was evaluated at an urgent care 1 month after she injured her left hand. She stated that she jammed her finger and thought she dislocated it. An x-ray of her left hand suggested no acute bony abnormality at the time. She was treated with supportive care. However, 6 months later she feels as if her range of motion and soreness has not progressed the way she thought it would. She states that when she attempts to grab certain objects in her house and when she bumps her left finger into things she has 6/10 pain. At times, she states that her proximal index finger swells at random. She denies her finger getting caught during active range of motion. She denies reinjury, distal swelling, numbness, tingling, weakness, warmth, erythema, fever, and chills. She has not taken anything for pain.     PASTMEDICAL HISTORY     Past Medical History:   Diagnosis Date    Acne     Allergic rhinitis     Angular cheilitis     Asthma     COPD (chronic obstructive pulmonary disease) (HCC)     Crohn's disease (HCC)     Depression     GERD (gastroesophageal reflux disease)     Hyperlipidemia     Hypertension     Insulin resistance     Pelvic pain     question polycystic ovarian syndrome    Pneumonia     Xerosis of skin     feet       Patient Active Problem List   Diagnosis Code    Right knee pain M25.561    Hematuria, gross R31.0    Generalized anxiety disorder F41.1    Crohn's disease ([address]) K50.90    Psychophysiological insomnia F51.04    Primary osteoarthritis

## 2023-08-28 ENCOUNTER — COMMUNITY OUTREACH (OUTPATIENT)
Dept: FAMILY MEDICINE CLINIC | Age: 63
End: 2023-08-28

## 2023-08-28 NOTE — PROGRESS NOTES
Patient's HM shows they are overdue for Colorectal and Mammogram Screening. Care Everywhere and  files searched. No results to attach to order nor HM updated.

## 2023-08-30 ENCOUNTER — TELEPHONE (OUTPATIENT)
Dept: FAMILY MEDICINE CLINIC | Age: 63
End: 2023-08-30

## 2023-08-30 NOTE — TELEPHONE ENCOUNTER
----- Message from FREDDY Chery CNP sent at 8/30/2023  9:48 AM EDT -----  Patient was to follow up in May.  Please call to schedule follow up appt.  ----- Message -----  From: Phoebe Santiago  Sent: 8/28/2023  12:59 PM EDT  To: FREDDY Chery CNP

## 2023-08-31 NOTE — TELEPHONE ENCOUNTER
If unable to schedule follow up, okay to dismiss d/t repeated noncompliance with mutually agreed upon plan of care.

## 2023-10-05 DIAGNOSIS — L70.0 ACNE VULGARIS: ICD-10-CM

## 2023-10-05 DIAGNOSIS — Z72.0 TOBACCO USE: ICD-10-CM

## 2023-10-05 DIAGNOSIS — J45.40 MODERATE PERSISTENT ASTHMA WITHOUT COMPLICATION: ICD-10-CM

## 2023-10-05 DIAGNOSIS — I10 ESSENTIAL HYPERTENSION: ICD-10-CM

## 2023-10-05 DIAGNOSIS — K50.90 CROHN'S DISEASE WITHOUT COMPLICATION, UNSPECIFIED GASTROINTESTINAL TRACT LOCATION (HCC): ICD-10-CM

## 2023-10-05 RX ORDER — MESALAMINE 800 MG/1
TABLET, DELAYED RELEASE ORAL
Qty: 180 TABLET | Refills: 0 | OUTPATIENT
Start: 2023-10-05

## 2023-10-05 RX ORDER — TRETINOIN 1 MG/G
CREAM TOPICAL
Qty: 45 G | Refills: 0 | OUTPATIENT
Start: 2023-10-05

## 2023-10-05 RX ORDER — AMLODIPINE BESYLATE 10 MG/1
10 TABLET ORAL DAILY
Qty: 30 TABLET | Refills: 0 | OUTPATIENT
Start: 2023-10-05

## 2023-10-05 RX ORDER — NICOTINE 21 MG/24HR
PATCH, TRANSDERMAL 24 HOURS TRANSDERMAL
Qty: 42 PATCH | Refills: 0 | OUTPATIENT
Start: 2023-10-05

## 2023-10-05 RX ORDER — ALBUTEROL SULFATE 90 UG/1
AEROSOL, METERED RESPIRATORY (INHALATION)
Qty: 9 G | Refills: 0 | OUTPATIENT
Start: 2023-10-05

## 2023-10-05 NOTE — TELEPHONE ENCOUNTER
Attempted to reach pt on both numbers listed regarding medication refill requests. Both numbers not currently working. Pt needs to schedule appt.

## 2024-10-16 ENCOUNTER — HOSPITAL ENCOUNTER (EMERGENCY)
Age: 64
Discharge: HOME | End: 2024-10-16
Payer: MEDICARE

## 2024-10-16 VITALS
OXYGEN SATURATION: 96 % | RESPIRATION RATE: 18 BRPM | HEART RATE: 68 BPM | DIASTOLIC BLOOD PRESSURE: 81 MMHG | TEMPERATURE: 98 F | SYSTOLIC BLOOD PRESSURE: 156 MMHG

## 2024-10-16 DIAGNOSIS — J44.9 CHRONIC OBSTRUCTIVE PULMONARY DISEASE, UNSPECIFIED COPD TYPE (HCC): ICD-10-CM

## 2024-10-16 DIAGNOSIS — J45.40 MODERATE PERSISTENT ASTHMA WITHOUT COMPLICATION: ICD-10-CM

## 2024-10-16 DIAGNOSIS — J44.1 COPD EXACERBATION (HCC): Primary | ICD-10-CM

## 2024-10-16 PROCEDURE — 99213 OFFICE O/P EST LOW 20 MIN: CPT

## 2024-10-16 PROCEDURE — 99213 OFFICE O/P EST LOW 20 MIN: CPT | Performed by: NURSE PRACTITIONER

## 2024-10-16 RX ORDER — PREDNISONE 20 MG/1
20 TABLET ORAL 2 TIMES DAILY
Qty: 10 TABLET | Refills: 0 | Status: SHIPPED | OUTPATIENT
Start: 2024-10-16 | End: 2024-10-21

## 2024-10-16 RX ORDER — DOXYCYCLINE HYCLATE 100 MG
100 TABLET ORAL 2 TIMES DAILY
Qty: 14 TABLET | Refills: 0 | Status: SHIPPED | OUTPATIENT
Start: 2024-10-16 | End: 2024-10-23

## 2024-10-16 RX ORDER — BUDESONIDE AND FORMOTEROL FUMARATE DIHYDRATE 160; 4.5 UG/1; UG/1
2 AEROSOL RESPIRATORY (INHALATION) 2 TIMES DAILY
Qty: 10.2 G | Refills: 0 | Status: SHIPPED | OUTPATIENT
Start: 2024-10-16

## 2024-10-16 RX ORDER — ALBUTEROL SULFATE 90 UG/1
2 INHALANT RESPIRATORY (INHALATION) EVERY 6 HOURS PRN
Qty: 1 EACH | Refills: 0 | Status: SHIPPED | OUTPATIENT
Start: 2024-10-16

## 2024-10-16 ASSESSMENT — ENCOUNTER SYMPTOMS
WHEEZING: 1
SHORTNESS OF BREATH: 1
COUGH: 1

## 2024-10-16 ASSESSMENT — PAIN DESCRIPTION - PAIN TYPE: TYPE: ACUTE PAIN

## 2024-10-16 ASSESSMENT — PAIN DESCRIPTION - FREQUENCY: FREQUENCY: INTERMITTENT

## 2024-10-16 ASSESSMENT — PAIN - FUNCTIONAL ASSESSMENT
PAIN_FUNCTIONAL_ASSESSMENT: 0-10
PAIN_FUNCTIONAL_ASSESSMENT: ACTIVITIES ARE NOT PREVENTED

## 2024-10-16 ASSESSMENT — PAIN DESCRIPTION - DESCRIPTORS: DESCRIPTORS: ACHING

## 2024-10-16 ASSESSMENT — PAIN DESCRIPTION - LOCATION: LOCATION: CHEST

## 2024-10-16 ASSESSMENT — PAIN DESCRIPTION - ONSET: ONSET: GRADUAL

## 2024-10-16 ASSESSMENT — PAIN SCALES - GENERAL: PAINLEVEL_OUTOF10: 6

## 2024-10-16 NOTE — ED PROVIDER NOTES
hours    MESALAMINE (DELZICOL) 800 MG TBEC TBEC TABLET    Take 2 tablets by mouth 3 times daily    METOPROLOL TARTRATE (LOPRESSOR) 25 MG TABLET    Take 1 tablet by mouth 2 times daily    NICOTINE (NICODERM CQ) 14 MG/24HR    Place 1 patch onto the skin every 24 hours for 14 days 21 mg/day for 6 weeks, followed by 14 mg/day for 2 weeks; finish with 7 mg/day for 2 weeks.    NICOTINE (NICODERM CQ) 21 MG/24HR    Place 1 patch onto the skin every 24 hours 21 mg/day for 6 weeks, followed by 14 mg/day for 2 weeks; finish with 7 mg/day for 2 weeks.    NICOTINE (NICODERM CQ) 7 MG/24HR    Place 1 patch onto the skin every 24 hours for 14 days 21 mg/day for 6 weeks, followed by 14 mg/day for 2 weeks; finish with 7 mg/day for 2 weeks.    NICOTINE POLACRILEX (NICORETTE) 2 MG LOZENGE    Take 1 lozenge by mouth as needed for Smoking cessation    TRAZODONE (DESYREL) 150 MG TABLET    Take 1 tablet by mouth nightly    TRETINOIN (RETIN-A) 0.05 % CREAM    Apply 1 application topically nightly    TRETINOIN (RETIN-A) 0.1 % CREAM    Apply topically nightly.       ALLERGIES     Patient is has No Known Allergies.    FAMILY HISTORY     Patient'sfamily history includes Asthma in her child and mother; Breast Cancer in her mother; Cancer in her maternal grandmother; Heart Attack in her father; Heart Disease in her father and other family members; High Blood Pressure in her father; High Cholesterol in her father; Hypertension in an other family member; Other in her father; Stroke in her father; Ulcerative Colitis in her daughter.    SOCIAL HISTORY     Patient  reports that she has been smoking cigarettes. She has a 20 pack-year smoking history. She has never used smokeless tobacco. She reports that she does not drink alcohol and does not use drugs.    PHYSICAL EXAM     ED TRIAGE VITALS   ,  ,  ,  ,    Physical Exam  Vitals and nursing note reviewed.   Constitutional:       General: She is not in acute distress.     Appearance: Normal appearance.  She is not ill-appearing or toxic-appearing.   HENT:      Head: Normocephalic and atraumatic.      Nose: No congestion or rhinorrhea.      Mouth/Throat:      Mouth: Mucous membranes are moist.      Pharynx: Oropharynx is clear.   Eyes:      Extraocular Movements: Extraocular movements intact.      Conjunctiva/sclera: Conjunctivae normal.      Pupils: Pupils are equal, round, and reactive to light.   Cardiovascular:      Rate and Rhythm: Normal rate and regular rhythm.      Pulses: Normal pulses.      Heart sounds: Normal heart sounds. No murmur heard.  Pulmonary:      Effort: Pulmonary effort is normal. No respiratory distress.      Breath sounds: Examination of the right-middle field reveals wheezing. Examination of the left-middle field reveals wheezing. Examination of the right-lower field reveals wheezing. Examination of the left-lower field reveals wheezing. Decreased breath sounds and wheezing present.   Abdominal:      General: Abdomen is flat.      Palpations: Abdomen is soft.      Tenderness: There is no abdominal tenderness.   Musculoskeletal:         General: No swelling or deformity. Normal range of motion.      Cervical back: Normal range of motion and neck supple.   Skin:     General: Skin is warm and dry.      Capillary Refill: Capillary refill takes less than 2 seconds.      Findings: No rash.   Neurological:      General: No focal deficit present.      Mental Status: She is alert and oriented to person, place, and time. Mental status is at baseline.   Psychiatric:         Mood and Affect: Mood normal.         Behavior: Behavior normal.         Thought Content: Thought content normal.         Judgment: Judgment normal.         DIAGNOSTIC RESULTS   Labs:No results found for this visit on 10/16/24.    IMAGING:  No orders to display     URGENT CARE COURSE:         Medications - No data to display  PROCEDURES:  FINALIMPRESSION      1. COPD exacerbation (HCC)    2. Chronic obstructive pulmonary disease,